# Patient Record
Sex: FEMALE | Race: WHITE | Employment: PART TIME | URBAN - METROPOLITAN AREA
[De-identification: names, ages, dates, MRNs, and addresses within clinical notes are randomized per-mention and may not be internally consistent; named-entity substitution may affect disease eponyms.]

---

## 2017-02-06 ENCOUNTER — LAB REQUISITION (OUTPATIENT)
Dept: LAB | Facility: HOSPITAL | Age: 57
End: 2017-02-06
Payer: COMMERCIAL

## 2017-02-06 DIAGNOSIS — N39.0 URINARY TRACT INFECTION: ICD-10-CM

## 2017-02-06 PROCEDURE — 87086 URINE CULTURE/COLONY COUNT: CPT | Performed by: INTERNAL MEDICINE

## 2017-02-06 PROCEDURE — 87186 SC STD MICRODIL/AGAR DIL: CPT | Performed by: INTERNAL MEDICINE

## 2017-02-06 PROCEDURE — 87077 CULTURE AEROBIC IDENTIFY: CPT | Performed by: INTERNAL MEDICINE

## 2017-03-16 ENCOUNTER — LAB ENCOUNTER (OUTPATIENT)
Dept: LAB | Facility: HOSPITAL | Age: 57
End: 2017-03-16
Attending: INTERNAL MEDICINE
Payer: COMMERCIAL

## 2017-03-16 DIAGNOSIS — Z00.00 ROUTINE GENERAL MEDICAL EXAMINATION AT A HEALTH CARE FACILITY: Primary | ICD-10-CM

## 2017-03-16 DIAGNOSIS — R73.01 IMPAIRED FASTING GLUCOSE: ICD-10-CM

## 2017-03-16 LAB
ALBUMIN SERPL BCP-MCNC: 3.8 G/DL (ref 3.5–4.8)
ALBUMIN/GLOB SERPL: 1.3 {RATIO} (ref 1–2)
ALP SERPL-CCNC: 55 U/L (ref 32–100)
ALT SERPL-CCNC: 31 U/L (ref 14–54)
ANION GAP SERPL CALC-SCNC: 10 MMOL/L (ref 0–18)
AST SERPL-CCNC: 23 U/L (ref 15–41)
BASOPHILS # BLD: 0 K/UL (ref 0–0.2)
BASOPHILS NFR BLD: 1 %
BILIRUB SERPL-MCNC: 0.6 MG/DL (ref 0.3–1.2)
BUN SERPL-MCNC: 11 MG/DL (ref 8–20)
BUN/CREAT SERPL: 15.7 (ref 10–20)
CALCIUM SERPL-MCNC: 9.4 MG/DL (ref 8.5–10.5)
CHLORIDE SERPL-SCNC: 103 MMOL/L (ref 95–110)
CO2 SERPL-SCNC: 27 MMOL/L (ref 22–32)
CREAT SERPL-MCNC: 0.7 MG/DL (ref 0.5–1.5)
EOSINOPHIL # BLD: 0.1 K/UL (ref 0–0.7)
EOSINOPHIL NFR BLD: 2 %
ERYTHROCYTE [DISTWIDTH] IN BLOOD BY AUTOMATED COUNT: 12.8 % (ref 11–15)
GLOBULIN PLAS-MCNC: 2.9 G/DL (ref 2.5–3.7)
GLUCOSE SERPL-MCNC: 118 MG/DL (ref 70–99)
HBA1C MFR BLD: 5.7 % (ref 4–6)
HCT VFR BLD AUTO: 38.6 % (ref 35–48)
HGB BLD-MCNC: 13.3 G/DL (ref 12–16)
LYMPHOCYTES # BLD: 1.7 K/UL (ref 1–4)
LYMPHOCYTES NFR BLD: 30 %
MCH RBC QN AUTO: 31.2 PG (ref 27–32)
MCHC RBC AUTO-ENTMCNC: 34.6 G/DL (ref 32–37)
MCV RBC AUTO: 90.4 FL (ref 80–100)
MONOCYTES # BLD: 0.5 K/UL (ref 0–1)
MONOCYTES NFR BLD: 8 %
NEUTROPHILS # BLD AUTO: 3.4 K/UL (ref 1.8–7.7)
NEUTROPHILS NFR BLD: 59 %
OSMOLALITY UR CALC.SUM OF ELEC: 290 MOSM/KG (ref 275–295)
PLATELET # BLD AUTO: 267 K/UL (ref 140–400)
PMV BLD AUTO: 8.2 FL (ref 7.4–10.3)
POTASSIUM SERPL-SCNC: 4.1 MMOL/L (ref 3.3–5.1)
PROT SERPL-MCNC: 6.7 G/DL (ref 5.9–8.4)
RBC # BLD AUTO: 4.27 M/UL (ref 3.7–5.4)
SODIUM SERPL-SCNC: 140 MMOL/L (ref 136–144)
TSH SERPL-ACNC: 2.15 UIU/ML (ref 0.34–5.6)
WBC # BLD AUTO: 5.7 K/UL (ref 4–11)

## 2017-03-16 PROCEDURE — 83036 HEMOGLOBIN GLYCOSYLATED A1C: CPT

## 2017-03-16 PROCEDURE — 80053 COMPREHEN METABOLIC PANEL: CPT

## 2017-03-16 PROCEDURE — 36415 COLL VENOUS BLD VENIPUNCTURE: CPT

## 2017-03-16 PROCEDURE — 84443 ASSAY THYROID STIM HORMONE: CPT

## 2017-03-16 PROCEDURE — 85025 COMPLETE CBC W/AUTO DIFF WBC: CPT

## 2018-01-19 ENCOUNTER — OFFICE VISIT (OUTPATIENT)
Dept: HEMATOLOGY/ONCOLOGY | Facility: HOSPITAL | Age: 58
End: 2018-01-19
Attending: INTERNAL MEDICINE
Payer: COMMERCIAL

## 2018-01-19 VITALS
WEIGHT: 196 LBS | TEMPERATURE: 98 F | BODY MASS INDEX: 33.46 KG/M2 | SYSTOLIC BLOOD PRESSURE: 172 MMHG | HEIGHT: 64 IN | HEART RATE: 81 BPM | RESPIRATION RATE: 16 BRPM | DIASTOLIC BLOOD PRESSURE: 82 MMHG

## 2018-01-19 DIAGNOSIS — D05.11 DUCTAL CARCINOMA IN SITU (DCIS) OF RIGHT BREAST: Primary | ICD-10-CM

## 2018-01-19 DIAGNOSIS — I10 ESSENTIAL HYPERTENSION: ICD-10-CM

## 2018-01-19 DIAGNOSIS — K21.00 GASTROESOPHAGEAL REFLUX DISEASE WITH ESOPHAGITIS: ICD-10-CM

## 2018-01-19 DIAGNOSIS — F32.A ANXIETY AND DEPRESSION: ICD-10-CM

## 2018-01-19 DIAGNOSIS — F41.9 ANXIETY AND DEPRESSION: ICD-10-CM

## 2018-01-19 PROCEDURE — 99214 OFFICE O/P EST MOD 30 MIN: CPT | Performed by: INTERNAL MEDICINE

## 2018-01-19 PROCEDURE — 99212 OFFICE O/P EST SF 10 MIN: CPT | Performed by: INTERNAL MEDICINE

## 2018-01-19 RX ORDER — ACETAMINOPHEN 160 MG
2000 TABLET,DISINTEGRATING ORAL DAILY
COMMUNITY

## 2018-01-21 PROBLEM — D05.11 DUCTAL CARCINOMA IN SITU (DCIS) OF RIGHT BREAST: Status: ACTIVE | Noted: 2018-01-21

## 2018-01-21 PROBLEM — I10 ESSENTIAL HYPERTENSION: Status: ACTIVE | Noted: 2018-01-21

## 2018-01-21 PROBLEM — K21.00 GASTROESOPHAGEAL REFLUX DISEASE WITH ESOPHAGITIS: Status: ACTIVE | Noted: 2018-01-21

## 2018-01-22 NOTE — PROGRESS NOTES
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 She has a strong family history of breast cancer with her maternal grandmother and mother. She denies known breast lesion, bone pain, or shortness of breath.     She Migraines previously diagnosed - CT brain negative -essentially resolved with [facility] and decrease stress   Hematological: Negative for adenopathy. Psychiatric/Behavioral: Positive for dysphoric mood. The patient is nervous/anxious.             Cu • Migraine, unspecified, without mention of intractable migraine without mention of status migrainosus 03/30/2012   • Mitral valve disorders(424.0) 03/30/2012   • N&V (nausea and vomiting) 2011    with temp   • Post-menopausal bleeding 2009   • Unspecified Abdominal: Soft. Bowel sounds are normal. She exhibits no distension. There is no tenderness. There is no guarding. Musculoskeletal: Normal range of motion. She exhibits no edema or tenderness. Lymphadenopathy:     She has no cervical adenopathy.    [facility], [facility] BOAZ MENDEZTL DIAG W CAD              SHEILA, 4/22/2015, 9:37.     INDICATIONS: Right breast cancer post lumpectomy and radiation therapy in               2004. 5 years of tamoxifen, until 2009.     BREAST COMPOS

## 2018-03-16 ENCOUNTER — HOSPITAL ENCOUNTER (OUTPATIENT)
Dept: MAMMOGRAPHY | Facility: HOSPITAL | Age: 58
Discharge: HOME | End: 2018-03-16
Attending: INTERNAL MEDICINE
Payer: COMMERCIAL

## 2018-03-16 DIAGNOSIS — D05.11 DUCTAL CARCINOMA IN SITU (DCIS) OF RIGHT BREAST: ICD-10-CM

## 2018-03-16 PROCEDURE — 77066 DX MAMMO INCL CAD BI: CPT | Performed by: INTERNAL MEDICINE

## 2018-03-30 ENCOUNTER — LAB ENCOUNTER (OUTPATIENT)
Dept: LAB | Facility: HOSPITAL | Age: 58
End: 2018-03-30
Attending: INTERNAL MEDICINE
Payer: COMMERCIAL

## 2018-03-30 DIAGNOSIS — D05.11 DUCTAL CARCINOMA IN SITU (DCIS) OF RIGHT BREAST: ICD-10-CM

## 2018-03-30 PROCEDURE — 85025 COMPLETE CBC W/AUTO DIFF WBC: CPT

## 2018-03-30 PROCEDURE — 36415 COLL VENOUS BLD VENIPUNCTURE: CPT

## 2018-03-30 PROCEDURE — 80061 LIPID PANEL: CPT

## 2018-03-30 PROCEDURE — 80050 GENERAL HEALTH PANEL: CPT

## 2018-03-30 PROCEDURE — 82306 VITAMIN D 25 HYDROXY: CPT

## 2018-03-30 PROCEDURE — 80053 COMPREHEN METABOLIC PANEL: CPT

## 2018-03-30 PROCEDURE — 83036 HEMOGLOBIN GLYCOSYLATED A1C: CPT

## 2018-07-02 NOTE — LETTER
WHERE IS YOUR PAIN NOW?  Debbie the areas on your body where you feel the described sensations.  Use the appropriate symbol.  Debbie the areas of radiation.  Include all affected areas.  Just to complete the picture, please draw in the face.     ACHE:  ^ ^ ^   NUMBNESS:  0000   PINS & NEEDLES:  = = = =                              ^ ^ ^                       0000              = = = =                                    ^ ^ ^                       0000            = = = =      BURNING:  XXXX   STABBING: ////                  XXXX                ////                         XXXX          ////     Please debbie the line below indicating your degree of pain right now  with 0 being no pain 10 being the worst pain possible.                                         0             1             2              3             4              5              6              7             8             9             10         Patient Signature:            
no fever

## 2019-02-11 ENCOUNTER — LAB ENCOUNTER (OUTPATIENT)
Dept: LAB | Facility: HOSPITAL | Age: 59
End: 2019-02-11
Attending: INTERNAL MEDICINE
Payer: COMMERCIAL

## 2019-02-11 DIAGNOSIS — R73.01 IMPAIRED FASTING GLUCOSE: ICD-10-CM

## 2019-02-11 DIAGNOSIS — E78.00 PURE HYPERCHOLESTEROLEMIA: Primary | ICD-10-CM

## 2019-02-11 DIAGNOSIS — Z00.00 ENCOUNTER FOR GENERAL ADULT MEDICAL EXAMINATION WITHOUT ABNORMAL FINDINGS: ICD-10-CM

## 2019-02-11 LAB
ALBUMIN SERPL BCP-MCNC: 3.9 G/DL (ref 3.5–4.8)
ALBUMIN/GLOB SERPL: 1.3 {RATIO} (ref 1–2)
ALP SERPL-CCNC: 59 U/L (ref 32–100)
ALT SERPL-CCNC: 55 U/L (ref 14–54)
ANION GAP SERPL CALC-SCNC: 11 MMOL/L (ref 0–18)
AST SERPL-CCNC: 31 U/L (ref 15–41)
BASOPHILS # BLD AUTO: 0.02 X10(3) UL (ref 0–0.2)
BASOPHILS NFR BLD AUTO: 0.4 %
BILIRUB SERPL-MCNC: 0.6 MG/DL (ref 0.3–1.2)
BILIRUB UR QL: NEGATIVE
BUN SERPL-MCNC: 9 MG/DL (ref 8–20)
BUN/CREAT SERPL: 11.8 (ref 10–20)
CALCIUM SERPL-MCNC: 9.8 MG/DL (ref 8.5–10.5)
CHLORIDE SERPL-SCNC: 104 MMOL/L (ref 95–110)
CHOLEST SERPL-MCNC: 280 MG/DL (ref 110–200)
CLARITY UR: CLEAR
CO2 SERPL-SCNC: 25 MMOL/L (ref 22–32)
COLOR UR: YELLOW
CREAT SERPL-MCNC: 0.76 MG/DL (ref 0.5–1.5)
CREAT UR-MCNC: 94 MG/DL
DEPRECATED RDW RBC AUTO: 41.6 FL (ref 35.1–46.3)
EOSINOPHIL # BLD AUTO: 0.08 X10(3) UL (ref 0–0.7)
EOSINOPHIL NFR BLD AUTO: 1.5 %
ERYTHROCYTE [DISTWIDTH] IN BLOOD BY AUTOMATED COUNT: 12.2 % (ref 11–15)
EST. AVERAGE GLUCOSE BLD GHB EST-MCNC: 128 MG/DL (ref 68–126)
GLOBULIN PLAS-MCNC: 2.9 G/DL (ref 2.5–3.7)
GLUCOSE SERPL-MCNC: 112 MG/DL (ref 70–99)
GLUCOSE UR-MCNC: NEGATIVE MG/DL
HBA1C MFR BLD HPLC: 6.1 % (ref ?–5.7)
HCT VFR BLD AUTO: 40.5 % (ref 35–48)
HDLC SERPL-MCNC: 43 MG/DL
HGB BLD-MCNC: 13.4 G/DL (ref 12–16)
HGB UR QL STRIP.AUTO: NEGATIVE
IMM GRANULOCYTES # BLD AUTO: 0.02 X10(3) UL (ref 0–1)
IMM GRANULOCYTES NFR BLD: 0.4 %
KETONES UR-MCNC: NEGATIVE MG/DL
LDLC SERPL CALC-MCNC: 192 MG/DL (ref 0–99)
LEUKOCYTE ESTERASE UR QL STRIP.AUTO: NEGATIVE
LYMPHOCYTES # BLD AUTO: 1.63 X10(3) UL (ref 1–4)
LYMPHOCYTES NFR BLD AUTO: 30.9 %
MCH RBC QN AUTO: 30.6 PG (ref 26–34)
MCHC RBC AUTO-ENTMCNC: 33.1 G/DL (ref 31–37)
MCV RBC AUTO: 92.5 FL (ref 80–100)
MICROALBUMIN UR-MCNC: 0.4 MG/DL (ref 0–1.8)
MICROALBUMIN/CREAT UR: 4.3 MG/G{CREAT} (ref 0–20)
MONOCYTES # BLD AUTO: 0.43 X10(3) UL (ref 0.1–1)
MONOCYTES NFR BLD AUTO: 8.2 %
NEUTROPHILS # BLD AUTO: 3.09 X10 (3) UL (ref 1.5–7.7)
NEUTROPHILS # BLD AUTO: 3.09 X10(3) UL (ref 1.5–7.7)
NEUTROPHILS NFR BLD AUTO: 58.6 %
NITRITE UR QL STRIP.AUTO: NEGATIVE
NONHDLC SERPL-MCNC: 237 MG/DL
OSMOLALITY UR CALC.SUM OF ELEC: 289 MOSM/KG (ref 275–295)
PATIENT FASTING: YES
PH UR: 5 [PH] (ref 5–8)
PLATELET # BLD AUTO: 316 10(3)UL (ref 150–450)
POTASSIUM SERPL-SCNC: 4.7 MMOL/L (ref 3.3–5.1)
PROT SERPL-MCNC: 6.8 G/DL (ref 5.9–8.4)
PROT UR-MCNC: NEGATIVE MG/DL
RBC # BLD AUTO: 4.38 X10(6)UL (ref 3.8–5.3)
SODIUM SERPL-SCNC: 140 MMOL/L (ref 136–144)
SP GR UR STRIP: 1.02 (ref 1–1.03)
TRIGL SERPL-MCNC: 223 MG/DL (ref 1–149)
TSH SERPL-ACNC: 1.58 UIU/ML (ref 0.45–5.33)
UROBILINOGEN UR STRIP-ACNC: <2
VIT C UR-MCNC: NEGATIVE MG/DL
WBC # BLD AUTO: 5.3 X10(3) UL (ref 4–11)

## 2019-02-11 PROCEDURE — 80061 LIPID PANEL: CPT

## 2019-02-11 PROCEDURE — 36415 COLL VENOUS BLD VENIPUNCTURE: CPT

## 2019-02-11 PROCEDURE — 85025 COMPLETE CBC W/AUTO DIFF WBC: CPT

## 2019-02-11 PROCEDURE — 80053 COMPREHEN METABOLIC PANEL: CPT

## 2019-02-11 PROCEDURE — 82570 ASSAY OF URINE CREATININE: CPT

## 2019-02-11 PROCEDURE — 82043 UR ALBUMIN QUANTITATIVE: CPT

## 2019-02-11 PROCEDURE — 83036 HEMOGLOBIN GLYCOSYLATED A1C: CPT

## 2019-02-11 PROCEDURE — 84443 ASSAY THYROID STIM HORMONE: CPT

## 2019-02-11 PROCEDURE — 81003 URINALYSIS AUTO W/O SCOPE: CPT

## 2019-08-15 ENCOUNTER — OFFICE VISIT (OUTPATIENT)
Dept: HEMATOLOGY/ONCOLOGY | Facility: HOSPITAL | Age: 59
End: 2019-08-15
Attending: INTERNAL MEDICINE
Payer: COMMERCIAL

## 2019-08-15 VITALS
SYSTOLIC BLOOD PRESSURE: 168 MMHG | WEIGHT: 197 LBS | OXYGEN SATURATION: 97 % | DIASTOLIC BLOOD PRESSURE: 75 MMHG | HEIGHT: 64 IN | BODY MASS INDEX: 33.63 KG/M2 | HEART RATE: 97 BPM | TEMPERATURE: 99 F | RESPIRATION RATE: 16 BRPM

## 2019-08-15 DIAGNOSIS — Z12.31 ENCOUNTER FOR SCREENING MAMMOGRAM FOR MALIGNANT NEOPLASM OF BREAST: Primary | ICD-10-CM

## 2019-08-15 DIAGNOSIS — I10 ESSENTIAL HYPERTENSION: ICD-10-CM

## 2019-08-15 DIAGNOSIS — F41.9 ANXIETY AND DEPRESSION: ICD-10-CM

## 2019-08-15 DIAGNOSIS — F32.A ANXIETY AND DEPRESSION: ICD-10-CM

## 2019-08-15 DIAGNOSIS — D05.11 DUCTAL CARCINOMA IN SITU OF RIGHT BREAST: ICD-10-CM

## 2019-08-15 DIAGNOSIS — R10.84 ABDOMINAL DISCOMFORT, GENERALIZED: ICD-10-CM

## 2019-08-15 PROCEDURE — 99214 OFFICE O/P EST MOD 30 MIN: CPT | Performed by: INTERNAL MEDICINE

## 2019-08-16 NOTE — PROGRESS NOTES
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 She has a strong family history of breast cancer with her maternal grandmother and mother. Unfortunately she has not had a repeat mammogram since 3/16/2018. Gastrointestinal: Negative for abdominal pain, constipation, nausea and vomiting. Feels better limiting wheat in her diet but has not been diagnosed with celiac disease   Endocrine: Negative for polydipsia, polyphagia and polyuria.    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 Neck supple. No thyromegaly present. Cardiovascular: Normal rate and regular rhythm. Murmur heard. I/VI REYNA   Pulmonary/Chest: Effort normal and breath sounds normal. She has no wheezes. She exhibits no tenderness. Patient's initial blood pressure was elevated today and remained elevated with a manual cuff to 156/76. The blood pressure was taken when the patient was upset with providing a history of her symptoms and with a discussion about her nephews.       Patient

## 2019-08-18 ENCOUNTER — HOSPITAL ENCOUNTER (EMERGENCY)
Facility: HOSPITAL | Age: 59
Discharge: HOME | End: 2019-08-18
Attending: EMERGENCY MEDICINE
Payer: COMMERCIAL

## 2019-08-18 ENCOUNTER — HOSPITAL ENCOUNTER (OUTPATIENT)
Age: 59
Discharge: HOME | End: 2019-08-18
Payer: COMMERCIAL

## 2019-08-18 ENCOUNTER — APPOINTMENT (OUTPATIENT)
Dept: CT IMAGING | Facility: HOSPITAL | Age: 59
End: 2019-08-18
Attending: EMERGENCY MEDICINE
Payer: COMMERCIAL

## 2019-08-18 VITALS
BODY MASS INDEX: 33.63 KG/M2 | DIASTOLIC BLOOD PRESSURE: 69 MMHG | TEMPERATURE: 100 F | HEART RATE: 92 BPM | SYSTOLIC BLOOD PRESSURE: 144 MMHG | OXYGEN SATURATION: 98 % | RESPIRATION RATE: 20 BRPM | WEIGHT: 197 LBS | HEIGHT: 64 IN

## 2019-08-18 VITALS
BODY MASS INDEX: 33.63 KG/M2 | RESPIRATION RATE: 20 BRPM | OXYGEN SATURATION: 99 % | SYSTOLIC BLOOD PRESSURE: 143 MMHG | HEART RATE: 96 BPM | WEIGHT: 197 LBS | TEMPERATURE: 99 F | HEIGHT: 64 IN | DIASTOLIC BLOOD PRESSURE: 70 MMHG

## 2019-08-18 DIAGNOSIS — K57.92 ACUTE DIVERTICULITIS: Primary | ICD-10-CM

## 2019-08-18 DIAGNOSIS — R11.2 NAUSEA VOMITING AND DIARRHEA: Primary | ICD-10-CM

## 2019-08-18 DIAGNOSIS — R19.7 NAUSEA VOMITING AND DIARRHEA: Primary | ICD-10-CM

## 2019-08-18 LAB
ALBUMIN SERPL-MCNC: 3.7 G/DL (ref 3.4–5)
ALBUMIN/GLOB SERPL: 1 {RATIO} (ref 1–2)
ALP LIVER SERPL-CCNC: 65 U/L (ref 46–118)
ALT SERPL-CCNC: 60 U/L (ref 13–56)
ANION GAP SERPL CALC-SCNC: 10 MMOL/L (ref 0–18)
AST SERPL-CCNC: 25 U/L (ref 15–37)
BASOPHILS # BLD AUTO: 0.02 X10(3) UL (ref 0–0.2)
BASOPHILS NFR BLD AUTO: 0.2 %
BILIRUB SERPL-MCNC: 0.4 MG/DL (ref 0.1–2)
BILIRUB UR QL: NEGATIVE
BUN BLD-MCNC: 13 MG/DL (ref 7–18)
BUN/CREAT SERPL: 16.3 (ref 10–20)
CALCIUM BLD-MCNC: 9 MG/DL (ref 8.5–10.1)
CHLORIDE SERPL-SCNC: 105 MMOL/L (ref 98–112)
CLARITY UR: CLEAR
CO2 SERPL-SCNC: 26 MMOL/L (ref 21–32)
COLOR UR: COLORLESS
CREAT BLD-MCNC: 0.8 MG/DL (ref 0.55–1.02)
DEPRECATED RDW RBC AUTO: 42.5 FL (ref 35.1–46.3)
EOSINOPHIL # BLD AUTO: 0.03 X10(3) UL (ref 0–0.7)
EOSINOPHIL NFR BLD AUTO: 0.2 %
ERYTHROCYTE [DISTWIDTH] IN BLOOD BY AUTOMATED COUNT: 12.6 % (ref 11–15)
GLOBULIN PLAS-MCNC: 3.7 G/DL (ref 2.8–4.4)
GLUCOSE BLD-MCNC: 109 MG/DL (ref 70–99)
GLUCOSE UR-MCNC: NEGATIVE MG/DL
HCT VFR BLD AUTO: 37.4 % (ref 35–48)
HGB BLD-MCNC: 12.6 G/DL (ref 12–16)
HGB UR QL STRIP.AUTO: NEGATIVE
IMM GRANULOCYTES # BLD AUTO: 0.04 X10(3) UL (ref 0–1)
IMM GRANULOCYTES NFR BLD: 0.3 %
KETONES UR-MCNC: NEGATIVE MG/DL
LEUKOCYTE ESTERASE UR QL STRIP.AUTO: NEGATIVE
LIPASE SERPL-CCNC: 117 U/L (ref 73–393)
LYMPHOCYTES # BLD AUTO: 1.62 X10(3) UL (ref 1–4)
LYMPHOCYTES NFR BLD AUTO: 12.5 %
M PROTEIN MFR SERPL ELPH: 7.4 G/DL (ref 6.4–8.2)
MCH RBC QN AUTO: 31.2 PG (ref 26–34)
MCHC RBC AUTO-ENTMCNC: 33.7 G/DL (ref 31–37)
MCV RBC AUTO: 92.6 FL (ref 80–100)
MONOCYTES # BLD AUTO: 0.99 X10(3) UL (ref 0.1–1)
MONOCYTES NFR BLD AUTO: 7.6 %
NEUTROPHILS # BLD AUTO: 10.25 X10 (3) UL (ref 1.5–7.7)
NEUTROPHILS # BLD AUTO: 10.25 X10(3) UL (ref 1.5–7.7)
NEUTROPHILS NFR BLD AUTO: 79.2 %
NITRITE UR QL STRIP.AUTO: NEGATIVE
OSMOLALITY SERPL CALC.SUM OF ELEC: 293 MOSM/KG (ref 275–295)
PH UR: 6 [PH] (ref 5–8)
PLATELET # BLD AUTO: 325 10(3)UL (ref 150–450)
POTASSIUM SERPL-SCNC: 3.3 MMOL/L (ref 3.5–5.1)
PROT UR-MCNC: NEGATIVE MG/DL
RBC # BLD AUTO: 4.04 X10(6)UL (ref 3.8–5.3)
SODIUM SERPL-SCNC: 141 MMOL/L (ref 136–145)
SP GR UR STRIP: 1 (ref 1–1.03)
UROBILINOGEN UR STRIP-ACNC: <2
VIT C UR-MCNC: NEGATIVE MG/DL
WBC # BLD AUTO: 13 X10(3) UL (ref 4–11)

## 2019-08-18 PROCEDURE — 99212 OFFICE O/P EST SF 10 MIN: CPT

## 2019-08-18 PROCEDURE — 99202 OFFICE O/P NEW SF 15 MIN: CPT

## 2019-08-18 PROCEDURE — 85025 COMPLETE CBC W/AUTO DIFF WBC: CPT | Performed by: EMERGENCY MEDICINE

## 2019-08-18 PROCEDURE — 83690 ASSAY OF LIPASE: CPT | Performed by: EMERGENCY MEDICINE

## 2019-08-18 PROCEDURE — 80053 COMPREHEN METABOLIC PANEL: CPT | Performed by: EMERGENCY MEDICINE

## 2019-08-18 PROCEDURE — 74176 CT ABD & PELVIS W/O CONTRAST: CPT | Performed by: EMERGENCY MEDICINE

## 2019-08-18 PROCEDURE — 36415 COLL VENOUS BLD VENIPUNCTURE: CPT

## 2019-08-18 PROCEDURE — 99284 EMERGENCY DEPT VISIT MOD MDM: CPT

## 2019-08-18 PROCEDURE — 81001 URINALYSIS AUTO W/SCOPE: CPT | Performed by: EMERGENCY MEDICINE

## 2019-08-18 RX ORDER — AMOXICILLIN AND CLAVULANATE POTASSIUM 875; 125 MG/1; MG/1
1 TABLET, FILM COATED ORAL 2 TIMES DAILY
Qty: 20 TABLET | Refills: 0 | Status: SHIPPED | OUTPATIENT
Start: 2019-08-18 | End: 2019-08-18

## 2019-08-18 RX ORDER — AMOXICILLIN AND CLAVULANATE POTASSIUM 875; 125 MG/1; MG/1
1 TABLET, FILM COATED ORAL 2 TIMES DAILY
Qty: 20 TABLET | Refills: 0 | Status: SHIPPED | OUTPATIENT
Start: 2019-08-18 | End: 2019-08-28

## 2019-08-18 NOTE — ED PROVIDER NOTES
Patient presents with:  Diarrhea      HPI:     Elly Segal is a 60year old female with a history of HTN, breast ca, GERD, anxiety, anemia presents with a chief complaint of diarrhea X2 weeks. Pt reports intermittent diarrhea over the last few weeks.  No nausea types were placed in this encounter. Labs performed this visit:  No results found for this or any previous visit (from the past 10 hour(s)).      Diagnosis:    ICD-10-CM    1. Nausea vomiting and diarrhea R11.2     R19.7        All results reviewed and

## 2019-08-18 NOTE — ED NOTES
Endy Ornelas presents with low bilateral abdominal pain since 8/1, body aches, diarrhea. Endy Ornelas states she thought symptoms would waldemar. Endy Ornelas states she continues to have abdominal cramping and diarrhea about 20 minutes after she eats. No vomiting.  No hx of

## 2019-08-18 NOTE — ED INITIAL ASSESSMENT (HPI)
REPORTS INTERMITTENT EPISODES OF DIARRHEA FOR THE PAST 2 WEEKS. T AMX 99.2 AT HOME. PATIENT REPORTS BOWEL MOVEMENTS AFTER EATING. PATIENT IS URINATING NORMALLY.

## 2019-08-18 NOTE — ED PROVIDER NOTES
Patient Seen in: [facility] AND [facility] Emergency Department    History   Patient presents with:  Nausea/Vomiting/Diarrhea (gastrointestinal)    Stated Complaint:     HPI    41-year-old female presenting for evaluation of diarrhea.   This has been intermittent 2011    with temp   • Post-menopausal bleeding 2009   • Unspecified adverse effect of unspecified drug, medicinal and biological substance 03/30/2012              Past Surgical History:   Procedure Laterality Date   • HYSTEROSCOPY,WITH SAMPLING  2009   • L Color Colorless (*)     All other components within normal limits   CBC W/ DIFFERENTIAL - Abnormal; Notable for the following components:    WBC 13.0 (*)     Neutrophil Absolute Prelim 10.25 (*)     Neutrophil Absolute 10.25 (*)     All other components wi pelvis. On reevaluation, patient resting comfortably. She has not been able to provide a liquid stool sample throughout the ED stay. CT concerning for mild sigmoid diverticulitis.   Patient is tolerating p.o. and pain is well controlled so we will

## 2019-08-19 NOTE — ED NOTES
Pt to CT at this time. Made aware of need for additional UA. Pt remains calm, cooperative, RR even and unlabored.

## 2019-09-18 ENCOUNTER — HOSPITAL ENCOUNTER (OUTPATIENT)
Dept: MAMMOGRAPHY | Facility: HOSPITAL | Age: 59
Discharge: HOME | End: 2019-09-18
Attending: INTERNAL MEDICINE
Payer: COMMERCIAL

## 2019-09-18 DIAGNOSIS — Z12.31 ENCOUNTER FOR SCREENING MAMMOGRAM FOR MALIGNANT NEOPLASM OF BREAST: ICD-10-CM

## 2019-09-18 PROCEDURE — 77063 BREAST TOMOSYNTHESIS BI: CPT | Performed by: INTERNAL MEDICINE

## 2019-09-18 PROCEDURE — 77067 SCR MAMMO BI INCL CAD: CPT | Performed by: INTERNAL MEDICINE

## 2020-11-20 DIAGNOSIS — Z20.822 SUSPECTED 2019 NOVEL CORONAVIRUS INFECTION: Primary | ICD-10-CM

## 2020-11-23 ENCOUNTER — APPOINTMENT (OUTPATIENT)
Dept: LAB | Facility: HOSPITAL | Age: 60
End: 2020-11-23
Attending: INTERNAL MEDICINE
Payer: COMMERCIAL

## 2020-11-23 DIAGNOSIS — Z20.822 SUSPECTED 2019 NOVEL CORONAVIRUS INFECTION: ICD-10-CM

## 2021-02-05 ENCOUNTER — OFFICE VISIT (OUTPATIENT)
Dept: HEMATOLOGY/ONCOLOGY | Facility: HOSPITAL | Age: 61
End: 2021-02-05
Attending: INTERNAL MEDICINE
Payer: COMMERCIAL

## 2021-02-05 VITALS
TEMPERATURE: 98 F | WEIGHT: 205 LBS | OXYGEN SATURATION: 98 % | SYSTOLIC BLOOD PRESSURE: 153 MMHG | DIASTOLIC BLOOD PRESSURE: 70 MMHG | HEIGHT: 64 IN | HEART RATE: 84 BPM | RESPIRATION RATE: 18 BRPM | BODY MASS INDEX: 35 KG/M2

## 2021-02-05 DIAGNOSIS — D05.11 DUCTAL CARCINOMA IN SITU OF RIGHT BREAST: Primary | ICD-10-CM

## 2021-02-05 DIAGNOSIS — F41.9 ANXIETY AND DEPRESSION: ICD-10-CM

## 2021-02-05 DIAGNOSIS — F32.A ANXIETY AND DEPRESSION: ICD-10-CM

## 2021-02-05 LAB
ALBUMIN SERPL-MCNC: 3.7 G/DL (ref 3.4–5)
ALBUMIN/GLOB SERPL: 1.1 {RATIO} (ref 1–2)
ALP LIVER SERPL-CCNC: 66 U/L
ALT SERPL-CCNC: 77 U/L
ANION GAP SERPL CALC-SCNC: 6 MMOL/L (ref 0–18)
AST SERPL-CCNC: 35 U/L (ref 15–37)
BASOPHILS # BLD AUTO: 0.02 X10(3) UL (ref 0–0.2)
BASOPHILS NFR BLD AUTO: 0.3 %
BILIRUB SERPL-MCNC: 0.2 MG/DL (ref 0.1–2)
BUN BLD-MCNC: 13 MG/DL (ref 7–18)
BUN/CREAT SERPL: 16.3 (ref 10–20)
CALCIUM BLD-MCNC: 9 MG/DL (ref 8.5–10.1)
CHLORIDE SERPL-SCNC: 108 MMOL/L (ref 98–112)
CO2 SERPL-SCNC: 27 MMOL/L (ref 21–32)
CREAT BLD-MCNC: 0.8 MG/DL
DEPRECATED RDW RBC AUTO: 41.1 FL (ref 35.1–46.3)
EOSINOPHIL # BLD AUTO: 0.11 X10(3) UL (ref 0–0.7)
EOSINOPHIL NFR BLD AUTO: 1.4 %
ERYTHROCYTE [DISTWIDTH] IN BLOOD BY AUTOMATED COUNT: 12 % (ref 11–15)
GLOBULIN PLAS-MCNC: 3.5 G/DL (ref 2.8–4.4)
GLUCOSE BLD-MCNC: 94 MG/DL (ref 70–99)
HCT VFR BLD AUTO: 38.8 %
HGB BLD-MCNC: 12.9 G/DL
IMM GRANULOCYTES # BLD AUTO: 0.02 X10(3) UL (ref 0–1)
IMM GRANULOCYTES NFR BLD: 0.3 %
LYMPHOCYTES # BLD AUTO: 2.03 X10(3) UL (ref 1–4)
LYMPHOCYTES NFR BLD AUTO: 25.6 %
M PROTEIN MFR SERPL ELPH: 7.2 G/DL (ref 6.4–8.2)
MCH RBC QN AUTO: 30.9 PG (ref 26–34)
MCHC RBC AUTO-ENTMCNC: 33.2 G/DL (ref 31–37)
MCV RBC AUTO: 93 FL
MONOCYTES # BLD AUTO: 0.69 X10(3) UL (ref 0.1–1)
MONOCYTES NFR BLD AUTO: 8.7 %
NEUTROPHILS # BLD AUTO: 5.05 X10 (3) UL (ref 1.5–7.7)
NEUTROPHILS # BLD AUTO: 5.05 X10(3) UL (ref 1.5–7.7)
NEUTROPHILS NFR BLD AUTO: 63.7 %
OSMOLALITY SERPL CALC.SUM OF ELEC: 292 MOSM/KG (ref 275–295)
PATIENT FASTING Y/N/NP: NO
PLATELET # BLD AUTO: 315 10(3)UL (ref 150–450)
POTASSIUM SERPL-SCNC: 4 MMOL/L (ref 3.5–5.1)
RBC # BLD AUTO: 4.17 X10(6)UL
SODIUM SERPL-SCNC: 141 MMOL/L (ref 136–145)
WBC # BLD AUTO: 7.9 X10(3) UL (ref 4–11)

## 2021-02-05 PROCEDURE — 99213 OFFICE O/P EST LOW 20 MIN: CPT | Performed by: INTERNAL MEDICINE

## 2021-02-05 NOTE — PROGRESS NOTES
HPI   2/5/2021  iMchaela Bell is a 63year old female who returns for follow-up of her DCIS. She has a strong family history of breast cancer with her maternal grandmother and mother. Unfortunately she has not had a repeat mammogram since 9/18/2019. congestion and hearing loss. Eyes: Negative for visual disturbance. Respiratory: Negative for cough and shortness of breath. Cardiovascular: Negative for chest pain and leg swelling.    Gastrointestinal: Negative for abdominal pain, constipation, na carcinoma in situ of right breast 7292    CLICK MAGNIFYING GLASS (ABOVE) OR F3 TO EXPAND 01/21/2004 MAMMOGRAM FIRST SCREENING  -  suspicious microcalcifications upper inner quadrant of her right breast 01/30/2004 MAMMOTOME BIOPSY STEROTACTIC  -  ductal car file        Minutes per session: Not on file      Stress: Not on file    Relationships      Social connections        Talks on phone: Not on file        Gets together: Not on file        Attends [employer] service: Not on file        Active member of club o (197 lb)  01/19/18 : 88.9 kg (196 lb)  03/04/16 : 85.7 kg (189 lb)     HENT:   Head: Normocephalic and atraumatic. Mouth/Throat: No oropharyngeal exudate. Eyes: Conjunctivae and EOM are normal. No scleral icterus. Neck: Normal range of motion.  Neck s and remained elevated with a manual cuff to 153/70. The blood pressure was taken when the patient was upset with providing a history of her symptoms and with a discussion about her nephews.       Patient is encouraged to see gastroenterology and to have a Neutrophils %      % 63.7 79.2   Lymphocytes %      % 25.6 12.5   Monocytes %      % 8.7 7.6   Eosinophils %      % 1.4 0.2   Basophils %      % 0.3 0.2   Immature Granulocyte %      % 0.3 0.3   Glucose      70 - 99 mg/dL 94 109 (H)   Sodium      136 - 1 IN 12 MONTHS.        Meds This Visit:  see list above    Imaging & Referrals:  Screening 2D 3D bilateral mammogram     2/10/2021 addendum REY T0M0 2D 3D screening bilateral  CONCLUSION:  There is no mammographic evidence of malignancy in either breast. As l

## 2021-02-10 ENCOUNTER — HOSPITAL ENCOUNTER (OUTPATIENT)
Dept: MAMMOGRAPHY | Facility: HOSPITAL | Age: 61
Discharge: HOME | End: 2021-02-10
Attending: INTERNAL MEDICINE
Payer: COMMERCIAL

## 2021-02-10 DIAGNOSIS — D05.11 DUCTAL CARCINOMA IN SITU OF RIGHT BREAST: ICD-10-CM

## 2021-02-10 PROCEDURE — 77067 SCR MAMMO BI INCL CAD: CPT | Performed by: INTERNAL MEDICINE

## 2021-02-10 PROCEDURE — 77063 BREAST TOMOSYNTHESIS BI: CPT | Performed by: INTERNAL MEDICINE

## 2021-04-28 ENCOUNTER — LAB ENCOUNTER (OUTPATIENT)
Dept: LAB | Facility: HOSPITAL | Age: 61
End: 2021-04-28
Attending: INTERNAL MEDICINE
Payer: COMMERCIAL

## 2021-04-28 DIAGNOSIS — Z00.00 WELL ADULT EXAM: Primary | ICD-10-CM

## 2021-04-28 DIAGNOSIS — R35.89 POLYURIA: ICD-10-CM

## 2021-04-28 PROCEDURE — 81001 URINALYSIS AUTO W/SCOPE: CPT

## 2022-02-11 ENCOUNTER — APPOINTMENT (OUTPATIENT)
Dept: HEMATOLOGY/ONCOLOGY | Facility: HOSPITAL | Age: 62
End: 2022-02-11
Attending: INTERNAL MEDICINE
Payer: COMMERCIAL

## 2022-03-10 ENCOUNTER — HOSPITAL ENCOUNTER (OUTPATIENT)
Age: 62
Discharge: HOME | End: 2022-03-10
Payer: COMMERCIAL

## 2022-03-10 VITALS
HEART RATE: 81 BPM | RESPIRATION RATE: 20 BRPM | TEMPERATURE: 98 F | OXYGEN SATURATION: 100 % | SYSTOLIC BLOOD PRESSURE: 169 MMHG | DIASTOLIC BLOOD PRESSURE: 72 MMHG

## 2022-03-10 DIAGNOSIS — R03.0 ELEVATED BLOOD PRESSURE READING: ICD-10-CM

## 2022-03-10 DIAGNOSIS — L03.114 CELLULITIS OF LEFT FOREARM: Primary | ICD-10-CM

## 2022-03-10 PROCEDURE — 99213 OFFICE O/P EST LOW 20 MIN: CPT

## 2022-03-10 RX ORDER — CEPHALEXIN 500 MG/1
500 CAPSULE ORAL 3 TIMES DAILY
Qty: 21 CAPSULE | Refills: 0 | Status: SHIPPED | OUTPATIENT
Start: 2022-03-10 | End: 2022-03-17

## 2022-03-23 ENCOUNTER — TELEPHONE (OUTPATIENT)
Dept: HEMATOLOGY/ONCOLOGY | Facility: HOSPITAL | Age: 62
End: 2022-03-23

## 2022-04-06 ENCOUNTER — TELEPHONE (OUTPATIENT)
Dept: HEMATOLOGY/ONCOLOGY | Facility: HOSPITAL | Age: 62
End: 2022-04-06

## 2022-04-06 NOTE — TELEPHONE ENCOUNTER
[facility] Breast pt of Dr. Mike Pitts, called 4/6/22

## 2022-04-28 ENCOUNTER — OFFICE VISIT (OUTPATIENT)
Dept: HEMATOLOGY/ONCOLOGY | Facility: HOSPITAL | Age: 62
End: 2022-04-28
Attending: INTERNAL MEDICINE
Payer: COMMERCIAL

## 2022-04-28 VITALS
BODY MASS INDEX: 34.83 KG/M2 | DIASTOLIC BLOOD PRESSURE: 75 MMHG | HEART RATE: 100 BPM | SYSTOLIC BLOOD PRESSURE: 156 MMHG | TEMPERATURE: 99 F | RESPIRATION RATE: 18 BRPM | OXYGEN SATURATION: 95 % | HEIGHT: 64 IN | WEIGHT: 204 LBS

## 2022-04-28 DIAGNOSIS — Z86.000 HISTORY OF DUCTAL CARCINOMA IN SITU (DCIS) OF BREAST: Primary | ICD-10-CM

## 2022-04-28 PROCEDURE — 99213 OFFICE O/P EST LOW 20 MIN: CPT | Performed by: INTERNAL MEDICINE

## 2022-05-04 ENCOUNTER — HOSPITAL ENCOUNTER (OUTPATIENT)
Dept: MAMMOGRAPHY | Facility: HOSPITAL | Age: 62
Discharge: HOME | End: 2022-05-04
Attending: INTERNAL MEDICINE
Payer: COMMERCIAL

## 2022-05-04 DIAGNOSIS — Z12.31 ENCOUNTER FOR SCREENING MAMMOGRAM FOR MALIGNANT NEOPLASM OF BREAST: ICD-10-CM

## 2022-05-04 PROCEDURE — 77067 SCR MAMMO BI INCL CAD: CPT | Performed by: INTERNAL MEDICINE

## 2022-05-04 PROCEDURE — 77063 BREAST TOMOSYNTHESIS BI: CPT | Performed by: INTERNAL MEDICINE

## 2022-05-12 ENCOUNTER — HOSPITAL ENCOUNTER (OUTPATIENT)
Dept: CV DIAGNOSTICS | Facility: HOSPITAL | Age: 62
Discharge: HOME | End: 2022-05-12
Attending: INTERNAL MEDICINE
Payer: COMMERCIAL

## 2022-05-12 DIAGNOSIS — R00.2 PALPITATIONS: ICD-10-CM

## 2022-05-12 PROCEDURE — 93306 TTE W/DOPPLER COMPLETE: CPT | Performed by: INTERNAL MEDICINE

## 2022-05-18 ENCOUNTER — HOSPITAL ENCOUNTER (OUTPATIENT)
Dept: MAMMOGRAPHY | Facility: HOSPITAL | Age: 62
Discharge: HOME | End: 2022-05-18
Attending: INTERNAL MEDICINE
Payer: COMMERCIAL

## 2022-05-18 DIAGNOSIS — R92.1 BREAST CALCIFICATIONS ON MAMMOGRAM: Primary | ICD-10-CM

## 2022-05-18 DIAGNOSIS — R92.8 ABNORMALITY OF LEFT BREAST ON SCREENING MAMMOGRAM: ICD-10-CM

## 2022-05-18 DIAGNOSIS — Z86.000 HISTORY OF DUCTAL CARCINOMA IN SITU (DCIS) OF BREAST: ICD-10-CM

## 2022-05-18 PROCEDURE — 77061 BREAST TOMOSYNTHESIS UNI: CPT | Performed by: INTERNAL MEDICINE

## 2022-05-18 PROCEDURE — 77065 DX MAMMO INCL CAD UNI: CPT | Performed by: INTERNAL MEDICINE

## 2022-06-01 ENCOUNTER — HOSPITAL ENCOUNTER (OUTPATIENT)
Dept: MAMMOGRAPHY | Facility: HOSPITAL | Age: 62
Discharge: HOME | End: 2022-06-01
Attending: INTERNAL MEDICINE
Payer: COMMERCIAL

## 2022-06-01 DIAGNOSIS — R92.1 BREAST CALCIFICATIONS ON MAMMOGRAM: ICD-10-CM

## 2022-06-01 DIAGNOSIS — Z86.000 HISTORY OF DUCTAL CARCINOMA IN SITU (DCIS) OF BREAST: ICD-10-CM

## 2022-06-01 PROCEDURE — 19082 BX BREAST ADD LESION STRTCTC: CPT | Performed by: INTERNAL MEDICINE

## 2022-06-01 PROCEDURE — 88360 TUMOR IMMUNOHISTOCHEM/MANUAL: CPT | Performed by: INTERNAL MEDICINE

## 2022-06-01 PROCEDURE — 19081 BX BREAST 1ST LESION STRTCTC: CPT | Performed by: INTERNAL MEDICINE

## 2022-06-01 PROCEDURE — 88305 TISSUE EXAM BY PATHOLOGIST: CPT | Performed by: INTERNAL MEDICINE

## 2022-06-01 PROCEDURE — 77065 DX MAMMO INCL CAD UNI: CPT | Performed by: INTERNAL MEDICINE

## 2022-06-01 PROCEDURE — 19499 UNLISTED PROCEDURE BREAST: CPT | Performed by: INTERNAL MEDICINE

## 2022-06-01 NOTE — PROCEDURES
[facility]  Procedure Note    Pradeep Sheldon Patient Status:  Outpatient    1960 MRN [id number]   Location [address] Attending Jasmyne Polanco MD   Hosp Day # 0 PCP Vale Russell MD     Procedure: Left breast stereotactic/aretha guided biopsy 2 sites    Pre-Procedure Diagnosis:    1. Left breast calcifications  2. Left breast asymmetry    Post-Procedure Diagnosis:   1. Left breast calcifications  2. Left breast asymmetry      Anesthesia:  Local    Findings:    1. Left breast calcifications  2.  Left breast asymmetry      Specimens: multiple cores at each site    Blood Loss:  minimal    Tourniquet Time: none  Complications:  None  Drains:  none      Sonia Bailey MD  2022

## 2022-06-02 ENCOUNTER — TELEPHONE (OUTPATIENT)
Dept: HEMATOLOGY/ONCOLOGY | Facility: HOSPITAL | Age: 62
End: 2022-06-02

## 2022-06-02 NOTE — TELEPHONE ENCOUNTER
I returned the patient's call as she is noted DCIS identified from her left breast biopsy. I reviewed with Shelia Fields that we can discuss her plan of care at her follow-up appointment. Patient mentions based on her prior history with this diagnosis in the opposite breast, she is comfortable with waiting for the next available appointment which will likely be at some point early next week. Patient is interested in undergoing a bilateral mastectomy with Dr. César Camacho. Patient mentions that she no longer wants to have regular mammography imaging following this diagnosis. She feels better and more comfortable with the diagnosis following this brief telephone encounter. She thanked me for the call and information. Our scheduling team will get her set up for a follow up with me. Efren Lake MD  [facility]  [address], [facility]

## 2022-06-02 NOTE — TELEPHONE ENCOUNTER
Patient calling and saw on MyChart the results of her Biopsy. Knows it is malignant, And would like to speak with Dr Yvonne Schirmer as soon as possible.

## 2022-06-03 ENCOUNTER — TELEPHONE (OUTPATIENT)
Dept: HEMATOLOGY/ONCOLOGY | Facility: HOSPITAL | Age: 62
End: 2022-06-03

## 2022-06-03 NOTE — TELEPHONE ENCOUNTER
Called patient and scheduled a follow up with Dr. Jose Malcolm for Monday 6/6 at 1:00pm- patient in agreement with date and time of appointment

## 2022-06-03 NOTE — TELEPHONE ENCOUNTER
Patient is s/p left breast stereotactic biopsy, performed 6/1/22 at [facility] AND [facility].  Patient has discussed malignant results with Dr. Jose Malcolm and has been referred to Dr. Dayna Gonsalez for further management. Phoned patient and introduced myself as Breast RN Navigator. Shared with patient my role to provide support and education, assist with care coordination, as well as connect her to supportive resources as she may need them. Patient shares her distant history of DCIS to her right breast treated with lumpectomy, radiation and endocrine therapy. Patient would like to meet with Dr. Dayna Gonsalez to discuss bilateral mastectomies. Patient shares given her history the anxiety associated with breast imaging has motivated her to this decision. Discussed with patient her thoughts regarding breast reconstruction, and she shares this is not something she wishes to pursue. Patient has had genetic testing in the past.  This was done in 2009 and was BRCA only. Discussed the now available wider panels and patient is interested in having this information. Scheduled patient for appointments on Friday 6/10/22 with Maria Isabel Freeman at 11am, and Dr. Dayna Gonsalez at noon. Patient has scheduled office follow up with Dr. Jose Malcolm for Monday 6/6/22. Provided patient with my contact information and have encouraged her to call with any questions or concerns.

## 2022-06-06 ENCOUNTER — OFFICE VISIT (OUTPATIENT)
Dept: HEMATOLOGY/ONCOLOGY | Facility: HOSPITAL | Age: 62
End: 2022-06-06
Attending: INTERNAL MEDICINE
Payer: COMMERCIAL

## 2022-06-06 VITALS
DIASTOLIC BLOOD PRESSURE: 66 MMHG | WEIGHT: 204 LBS | BODY MASS INDEX: 34.83 KG/M2 | TEMPERATURE: 99 F | HEART RATE: 86 BPM | OXYGEN SATURATION: 97 % | SYSTOLIC BLOOD PRESSURE: 138 MMHG | HEIGHT: 64 IN | RESPIRATION RATE: 18 BRPM

## 2022-06-06 DIAGNOSIS — D05.12 DUCTAL CARCINOMA IN SITU (DCIS) OF LEFT BREAST: Primary | ICD-10-CM

## 2022-06-06 DIAGNOSIS — Z86.000 HISTORY OF DUCTAL CARCINOMA IN SITU (DCIS) OF BREAST: ICD-10-CM

## 2022-06-06 PROCEDURE — 99211 OFF/OP EST MAY X REQ PHY/QHP: CPT

## 2022-06-10 ENCOUNTER — OFFICE VISIT (OUTPATIENT)
Dept: SURGERY | Facility: CLINIC | Age: 62
End: 2022-06-10
Payer: COMMERCIAL

## 2022-06-10 ENCOUNTER — GENETICS ENCOUNTER (OUTPATIENT)
Dept: GENETICS | Facility: HOSPITAL | Age: 62
End: 2022-06-10
Attending: GENETIC COUNSELOR, MS
Payer: COMMERCIAL

## 2022-06-10 ENCOUNTER — NURSE ONLY (OUTPATIENT)
Dept: HEMATOLOGY/ONCOLOGY | Facility: HOSPITAL | Age: 62
End: 2022-06-10
Attending: INTERNAL MEDICINE
Payer: COMMERCIAL

## 2022-06-10 ENCOUNTER — LAB ENCOUNTER (OUTPATIENT)
Dept: LAB | Facility: HOSPITAL | Age: 62
End: 2022-06-10
Payer: COMMERCIAL

## 2022-06-10 VITALS
SYSTOLIC BLOOD PRESSURE: 145 MMHG | RESPIRATION RATE: 16 BRPM | WEIGHT: 203 LBS | DIASTOLIC BLOOD PRESSURE: 80 MMHG | OXYGEN SATURATION: 97 % | TEMPERATURE: 98 F | HEART RATE: 73 BPM | HEIGHT: 64 IN | BODY MASS INDEX: 34.66 KG/M2

## 2022-06-10 DIAGNOSIS — Z85.3 PERSONAL HISTORY OF MALIGNANT NEOPLASM OF BREAST: ICD-10-CM

## 2022-06-10 DIAGNOSIS — D05.12 DUCTAL CARCINOMA IN SITU (DCIS) OF LEFT BREAST: Primary | ICD-10-CM

## 2022-06-10 DIAGNOSIS — Z01.818 PRE-OP EXAM: ICD-10-CM

## 2022-06-10 DIAGNOSIS — Z80.3 FAMILY HISTORY OF MALIGNANT NEOPLASM OF BREAST: ICD-10-CM

## 2022-06-10 LAB
ANION GAP SERPL CALC-SCNC: 6 MMOL/L (ref 0–18)
BUN BLD-MCNC: 17 MG/DL (ref 7–18)
BUN/CREAT SERPL: 21 (ref 10–20)
CALCIUM BLD-MCNC: 10 MG/DL (ref 8.5–10.1)
CHLORIDE SERPL-SCNC: 106 MMOL/L (ref 98–112)
CO2 SERPL-SCNC: 28 MMOL/L (ref 21–32)
CREAT BLD-MCNC: 0.81 MG/DL
FASTING STATUS PATIENT QL REPORTED: YES
GLUCOSE BLD-MCNC: 96 MG/DL (ref 70–99)
OSMOLALITY SERPL CALC.SUM OF ELEC: 291 MOSM/KG (ref 275–295)
POTASSIUM SERPL-SCNC: 4.4 MMOL/L (ref 3.5–5.1)
SODIUM SERPL-SCNC: 140 MMOL/L (ref 136–145)

## 2022-06-10 PROCEDURE — 36415 COLL VENOUS BLD VENIPUNCTURE: CPT

## 2022-06-10 PROCEDURE — 96040 HC GENETIC COUNSELING EA 30 MIN: CPT | Performed by: GENETIC COUNSELOR, MS

## 2022-06-10 PROCEDURE — 80048 BASIC METABOLIC PNL TOTAL CA: CPT

## 2022-06-10 NOTE — PATIENT INSTRUCTIONS
Dr. Hermelinda Parsons  Tel: [phone]  Fax: [phone]  [address] [place], [place] [address]  [phone]     Surgery/Procedure: Bilateral simple mastectomies, left lymphoscintigraphy, left sentinel lymph node biopsy, possible left axillary lymph node dissection. Anesthesia:   Gen + request pre op pec block from anesthesia  Surgery Length:   3 hours CPT:  35703,07188,37146   Wire LOC:   No   Nuc Med:   Yes Indy Seed:  No       Dx & ICD-10: Ductal carcinoma in situ (DCIS) of left breast (J63.04)   Radiology Instructions: N/a   _______________________________________________________________________________    1. Someone must accompany you the day of the procedure to drive you home safely, because of anesthesia. 2. You must remove any kind of makeup, nail polish, lotions, powders, creams or deodorant. 3. [place] ONLY: Pre-admission will give instruct you on when to take Gatorade and Tylenol/acetaminophen prior to your surgery, purchase 2 - 12oz bottles of regular Gatorade (NOT RED/SUGAR FREE). Otherwise, you may not eat or drink anything else after 11PM the night before surgery. 4. [place] ONLY: You may not eat or drink anything after midnight the day of your surgery. 5. Wear comfortable clothing that can be easily removed. 6. If you wear dentures, contacts lenses, or any prosthesis, you will be asked to remove them. 7. Do not drink alcohol or smoke 24 hours prior to your procedure. 8. Bring a picture ID and your insurance card. 9. You will be contacted by the hospital for Pre-Admission Covid-19 testing (regardless of vaccination status) to be scheduled as an appointment prior to surgery. They will call closer to the surgery date to set this up, because the earliest this can be done is 72 hours prior to surgery. 10.  The Pre-Admission Testing Department will call the day before to confirm your procedure, give you the time you need to arrive by and directions on where to go. They begin making calls after 2pm, if you are not contacted by 4pm, please call the surgeon's office listed above. 11. Do not take any blood thinners at least one week prior to the procedure/surgery. This includes aspirin, baby aspirin, Ibuprofen products, herbal supplements, diet medications, vitamin E, fish oil and green tea supplements. Please check other supplements for these ingredients. *TYLENOL or acetaminophen is acceptable*  12. If you take Coumadin, Plavix, Xarelto, or Eliquis, please contact your prescribing physician for special instructions on how long to hold. If you take insulin contact your primary care physician for special instructions. 15. Our surgery scheduler, Surjit Barclay, will be contacting you to discuss surgery dates. If you have any questions related to scheduling your surgery, please reach out to her at [phone].  _____________________________________________________________________  PRE-OPERATIVE TESTING IF INDICATED BELOW  PLEASE COMPLETE ASAP (AT LEAST 7-10 DAYS PRIOR TO SURGERY)  [] CBC [x] BMP [] CMP [] EKG    [] PT, PTT, INR [] Cardiac Clearance  [] H&P Medical Clearance [] Chest X-ray     Please call Central Scheduling to schedule an appointment for pre-operative labs/tests @ [phone]    Does the patient have a pacemaker or ICD?      [] Yes   [x] No

## 2022-06-17 ENCOUNTER — OFFICE VISIT (OUTPATIENT)
Dept: PHYSICAL THERAPY | Facility: HOSPITAL | Age: 62
End: 2022-06-17
Attending: SURGERY
Payer: COMMERCIAL

## 2022-06-17 NOTE — PATIENT INSTRUCTIONS
ARM EXERCISES AFTER BREAST SURGERY:    When you begin the exercise program, you will find that you may tire easily and there will be some discomfort as you attempt to perform the movements. However, you should continue to perform them to the point of slight discomfort, pulling, and stretching, but not to the point of pain. It may take several weeks before you are able to complete some of the exercises. Work at your own pace, your progress and amount of movement may be gradual. It may be helpful to take your pain medication an hour before starting. Persistence is the key to regaining complete range of motion. Do the exercises slowly and begin to add holds at the end range of each exercise. This helps stretch and strengthen the muscles. If you are having difficulty performing the exercises and feel you are not making progress, tell your surgeon. Some individuals need the assistance of a physical therapist to regain complete range of motion. After breast surgery, it is not uncommon for some to favor the use of their non-surgical arm as they resume their daily activities. Weakness in the surgical arm, which most experience to some degree, will cause this to happen. However, it is helpful if you remember that normal use of the surgical arm will gradually increase strength and range of motion. Assessing your range of motion:   If you could perform the following prior to surgery, are you able now to:   - Brush and comb your hair?  - Pull a shirt or sweater over your head? - Close a back-fastening bra?  - Completely zip up a dress that has a long back zipper? - Wash the upper part of your back in the shoulder blade area? - Reach over your head into a cabinet to remove an object? - Make a double bed? If you cannot perform your full range of motion approximately 8 weeks after your surgery, ask your surgeon if a referral to Physical Therapy is appropriate.  It is important to your recovery to regain your range of motion early in your recovery. Immediately after surgery:  - Take it easy, but do not avoid movement of your arm  - You can initiate gentle movements but do not lift anything heavy  - If you have drains, do not lift arm higher than shoulder height  - Playing cards or board games is a great way to initiate gentle movement of your arm  - If you have pain or difficulty moving your arm, Pendulum Shoulder Circles can be started immediately. These exercises will help decrease some of your pain and will initiate movement. One week after surgery:  - Begin using your arm for more activities, but still do not lift anything heavy  - If you still have a drain, ask your surgeon if it is OK to lift your arm higher than shoulder height. - If your arm is still painful and/or weak, keep doing the Pendulum Shoulder Circles but also progress towards doing the Wand Exercises  - Progress towards the AROM exercises (Active Range of Motion). - Keep progressing how high you are able to lift your arm  Four weeks after surgery:   - Keep progressing how high you are able to lift your arm  - You are now able to begin light lifting. Begin slowly, nothing more than a gallon of milk  Eight weeks after surgery:  - If you cannot perform your full range of motion approximately 8 weeks after your surgery, ask your surgeon if a referral to Physical Therapy is appropriate. It is important to your recovery to regain your range of motion early in your recovery. - Continue to progress using your arm as you did prior to surgery  - Slowly begin lifting more and resuming your normal activities (housework, exercising, and work)              Reference:  Ivon Shaw Breast Cancer Treatment Handbook: The Comprehensive Patient Navigation Guide. 8th Edition. [employer] 2014.  ISBN-13:736-8-06186-09-5  HEP2go for illustrations

## 2022-06-21 ENCOUNTER — TELEPHONE (OUTPATIENT)
Dept: GENETICS | Facility: HOSPITAL | Age: 62
End: 2022-06-21

## 2022-06-21 NOTE — TELEPHONE ENCOUNTER
Shared NEGATIVE genetic testing results with Tanvir Gunterfransisco over phone. She opted for 47 gene panel through [facility] [facility] ([employer] Common Hereditary Cancers Panel) and all results were negative. Released these results to her through lab's portal and will mail her a copy as well. She was reassured to hear this. All her questions were answered to the best of my ability and she was appreciative of the call.

## 2022-06-22 DIAGNOSIS — D05.12 DUCTAL CARCINOMA IN SITU (DCIS) OF LEFT BREAST: Primary | ICD-10-CM

## 2022-06-23 ENCOUNTER — OFFICE VISIT (OUTPATIENT)
Dept: HEMATOLOGY/ONCOLOGY | Facility: HOSPITAL | Age: 62
End: 2022-06-23
Attending: INTERNAL MEDICINE
Payer: COMMERCIAL

## 2022-06-23 DIAGNOSIS — D05.12 DUCTAL CARCINOMA IN SITU (DCIS) OF LEFT BREAST: Primary | ICD-10-CM

## 2022-06-23 PROCEDURE — 99211 OFF/OP EST MAY X REQ PHY/QHP: CPT

## 2022-06-23 RX ORDER — LIDOCAINE AND PRILOCAINE 25; 25 MG/G; MG/G
CREAM TOPICAL ONCE
Status: CANCELLED | OUTPATIENT
Start: 2022-06-23 | End: 2022-06-23

## 2022-06-23 RX ORDER — DIAZEPAM 5 MG/1
5 TABLET ORAL AS NEEDED
Status: CANCELLED | OUTPATIENT
Start: 2022-06-23

## 2022-06-24 ENCOUNTER — TELEPHONE (OUTPATIENT)
Dept: GENERAL RADIOLOGY | Facility: HOSPITAL | Age: 62
End: 2022-06-24

## 2022-06-24 NOTE — TELEPHONE ENCOUNTER
0920: Spoke with Johnathan Diaz at this time. Discussed sentinel lymph node mapping procedure to be done in the   nuclear medicine department prior to surgery on Tuesday, June 28. Procedure explained and questions answered. Ms. Natacha Becerril verbalized understanding and gratitude for the call.

## 2022-06-24 NOTE — PROGRESS NOTES
Patient presents to the [facility] for preoperative education. She is accompanied by a close friend for support. Patient is scheduled on 6/28/22 for bilateral simple mastectomies, left lymphoscintigraphy, left sentinel lymph node biopsy, possible left axillary lymph node dissection with Dr. Kati Bunch. Reviewed with patient lymphoscintigraphy, why this is performed and what to expect. Patient aware she will receive a call from Radiology RN at THE [facility] to reinforce and address any questions. Patient is aware she will have KENDRICK drains placed during her surgery. Shared with patient she may have 2-4 drains. Educated as to expected observations of KENDRICK drain output. This included color and volume of output. Educated as to when is appropriate to notify MD office. KENDRICK drain care to be performed BID, and prn dependant on drain output. KENDRICK drain care demonstrated to patient and her Care Partner. This included hand hygiene, stripping of tubing, emptying of drain, as well as measurement and documentation of output. Patient and Care Partner able to successfully demonstrate drain care to this writer. Trouble shooting of drain discussed as well. Provided patient with measurement cups, alcohol swabs, drain record and written drain instructions. Instructed patient she may shower the day after surgery. Patient is aware she will have a surgical dressing placed during surgery. This may consist of an elastic bandage and/or surgical bra. If elastic bandage was placed this may be removed on the 1st or 2nd day after surgery. Surgical dressing may be removed and discarded prior to showering. Shared with patient she will have steri strip dressing applied to her incision which will remain in place. Demonstrated to patient KENDRICK drain dressing of biopatch and tegaderm. Instructed patient should moisture be observed under tegaderm following showering, dressing must be changed as this is an increased risk for infection. Demonstrated dressing change to patient and Care Partner. Dressing supplies provided. Instructed patient to assess KENDRICK drain dressing prior to showering and reinforce if needed. Provided patient with lanyards for drain management while showering. Provided patient with dressing supplies to include kerlix gauze dressing as well as ABD pads. Educated patient to expect drainage along steri strip dressing for 1-3 days post operatively. Instructed patient to place guaze dressing on surgical incision during this time frame and then for comfort following this time frame. Patient is aware to continue to wear post operative bra, if placed during surgery. Also provided patient with breast camsiole for post operative management. Patient has been seen by Physical Therapy for pre operative assessment and education. Reinforced to patient activity restrictions. Patient is aware she will be discharged with narcotic pain medication. Reinforced to patient pain scale and when narcotic would be appropriate. Reinforced to patient side effects of narcotic medication and management of constipation. Also discussed management of pain with Tylenol or Ibuprofen. Cold application can also be performed for pain management. Instructed patient to limit cold application to 10 minutes and to not apply directly to her skin. Patient aware she may not drive until off narcotic for 24 hours and able to react in an emergency. Patient has scheduled follow up with Per HUERTA on 7/11/22, and Dr. Dino Zhang on 7/12/22. Emotional support provided to patient. Encouraged her to call with any questions, concerns, or needs.

## 2022-06-25 ENCOUNTER — LAB ENCOUNTER (OUTPATIENT)
Dept: LAB | Facility: HOSPITAL | Age: 62
End: 2022-06-25
Attending: SURGERY
Payer: COMMERCIAL

## 2022-06-25 ENCOUNTER — NURSE ONLY (OUTPATIENT)
Dept: LAB | Facility: HOSPITAL | Age: 62
End: 2022-06-25
Attending: SURGERY
Payer: COMMERCIAL

## 2022-06-25 DIAGNOSIS — D05.12 DUCTAL CARCINOMA IN SITU (DCIS) OF LEFT BREAST: ICD-10-CM

## 2022-06-25 DIAGNOSIS — Z20.822 ENCOUNTER FOR PREOPERATIVE SCREENING LABORATORY TESTING FOR COVID-19 VIRUS: ICD-10-CM

## 2022-06-25 DIAGNOSIS — Z01.812 ENCOUNTER FOR PREOPERATIVE SCREENING LABORATORY TESTING FOR COVID-19 VIRUS: ICD-10-CM

## 2022-06-25 LAB — SARS-COV-2 RNA RESP QL NAA+PROBE: NOT DETECTED

## 2022-06-25 PROCEDURE — 93010 ELECTROCARDIOGRAM REPORT: CPT | Performed by: SURGERY

## 2022-06-25 PROCEDURE — 93005 ELECTROCARDIOGRAM TRACING: CPT

## 2022-06-28 ENCOUNTER — HOSPITAL ENCOUNTER (OUTPATIENT)
Facility: HOSPITAL | Age: 62
Setting detail: HOSPITAL OUTPATIENT SURGERY
Discharge: HOME | End: 2022-06-28
Attending: SURGERY | Admitting: SURGERY
Payer: COMMERCIAL

## 2022-06-28 ENCOUNTER — ANESTHESIA EVENT (OUTPATIENT)
Dept: SURGERY | Facility: HOSPITAL | Age: 62
End: 2022-06-28
Payer: COMMERCIAL

## 2022-06-28 ENCOUNTER — HOSPITAL ENCOUNTER (OUTPATIENT)
Dept: NUCLEAR MEDICINE | Facility: HOSPITAL | Age: 62
Setting detail: HOSPITAL OUTPATIENT SURGERY
Discharge: HOME | End: 2022-06-28
Attending: SURGERY | Admitting: SURGERY
Payer: COMMERCIAL

## 2022-06-28 ENCOUNTER — ANESTHESIA (OUTPATIENT)
Dept: SURGERY | Facility: HOSPITAL | Age: 62
End: 2022-06-28
Payer: COMMERCIAL

## 2022-06-28 VITALS
BODY MASS INDEX: 34.83 KG/M2 | HEART RATE: 74 BPM | OXYGEN SATURATION: 96 % | TEMPERATURE: 98 F | HEIGHT: 64 IN | DIASTOLIC BLOOD PRESSURE: 60 MMHG | WEIGHT: 204 LBS | SYSTOLIC BLOOD PRESSURE: 136 MMHG | RESPIRATION RATE: 16 BRPM

## 2022-06-28 DIAGNOSIS — Z01.812 ENCOUNTER FOR PREOPERATIVE SCREENING LABORATORY TESTING FOR COVID-19 VIRUS: Primary | ICD-10-CM

## 2022-06-28 DIAGNOSIS — D05.12 DUCTAL CARCINOMA IN SITU (DCIS) OF LEFT BREAST: ICD-10-CM

## 2022-06-28 DIAGNOSIS — Z20.822 ENCOUNTER FOR PREOPERATIVE SCREENING LABORATORY TESTING FOR COVID-19 VIRUS: Primary | ICD-10-CM

## 2022-06-28 PROCEDURE — 88331 PATH CONSLTJ SURG 1 BLK 1SPC: CPT | Performed by: SURGERY

## 2022-06-28 PROCEDURE — 78195 LYMPH SYSTEM IMAGING: CPT | Performed by: SURGERY

## 2022-06-28 PROCEDURE — 88332 PATH CONSLTJ SURG EA ADD BLK: CPT | Performed by: SURGERY

## 2022-06-28 PROCEDURE — 88342 IMHCHEM/IMCYTCHM 1ST ANTB: CPT | Performed by: SURGERY

## 2022-06-28 PROCEDURE — 0HBV0ZZ EXCISION OF BILATERAL BREAST, OPEN APPROACH: ICD-10-PCS | Performed by: SURGERY

## 2022-06-28 PROCEDURE — 07B60ZX EXCISION OF LEFT AXILLARY LYMPHATIC, OPEN APPROACH, DIAGNOSTIC: ICD-10-PCS | Performed by: SURGERY

## 2022-06-28 PROCEDURE — 76942 ECHO GUIDE FOR BIOPSY: CPT | Performed by: STUDENT IN AN ORGANIZED HEALTH CARE EDUCATION/TRAINING PROGRAM

## 2022-06-28 PROCEDURE — 88307 TISSUE EXAM BY PATHOLOGIST: CPT | Performed by: SURGERY

## 2022-06-28 RX ORDER — ONDANSETRON 2 MG/ML
INJECTION INTRAMUSCULAR; INTRAVENOUS AS NEEDED
Status: DISCONTINUED | OUTPATIENT
Start: 2022-06-28 | End: 2022-06-28 | Stop reason: SURG

## 2022-06-28 RX ORDER — HYDROMORPHONE HYDROCHLORIDE 1 MG/ML
0.6 INJECTION, SOLUTION INTRAMUSCULAR; INTRAVENOUS; SUBCUTANEOUS EVERY 5 MIN PRN
Status: DISCONTINUED | OUTPATIENT
Start: 2022-06-28 | End: 2022-06-28

## 2022-06-28 RX ORDER — ROCURONIUM BROMIDE 10 MG/ML
INJECTION, SOLUTION INTRAVENOUS AS NEEDED
Status: DISCONTINUED | OUTPATIENT
Start: 2022-06-28 | End: 2022-06-28 | Stop reason: SURG

## 2022-06-28 RX ORDER — HYDROMORPHONE HYDROCHLORIDE 1 MG/ML
0.2 INJECTION, SOLUTION INTRAMUSCULAR; INTRAVENOUS; SUBCUTANEOUS EVERY 5 MIN PRN
Status: DISCONTINUED | OUTPATIENT
Start: 2022-06-28 | End: 2022-06-28

## 2022-06-28 RX ORDER — LIDOCAINE HYDROCHLORIDE ANHYDROUS AND DEXTROSE MONOHYDRATE .8; 5 G/100ML; G/100ML
INJECTION, SOLUTION INTRAVENOUS CONTINUOUS PRN
Status: DISCONTINUED | OUTPATIENT
Start: 2022-06-28 | End: 2022-06-28 | Stop reason: SURG

## 2022-06-28 RX ORDER — KETAMINE HYDROCHLORIDE 50 MG/ML
INJECTION, SOLUTION, CONCENTRATE INTRAMUSCULAR; INTRAVENOUS AS NEEDED
Status: DISCONTINUED | OUTPATIENT
Start: 2022-06-28 | End: 2022-06-28 | Stop reason: SURG

## 2022-06-28 RX ORDER — EPHEDRINE SULFATE 50 MG/ML
INJECTION INTRAVENOUS AS NEEDED
Status: DISCONTINUED | OUTPATIENT
Start: 2022-06-28 | End: 2022-06-28 | Stop reason: SURG

## 2022-06-28 RX ORDER — LIDOCAINE AND PRILOCAINE 25; 25 MG/G; MG/G
CREAM TOPICAL ONCE
Status: COMPLETED | OUTPATIENT
Start: 2022-06-28 | End: 2022-06-28

## 2022-06-28 RX ORDER — CLINDAMYCIN PHOSPHATE 900 MG/50ML
900 INJECTION INTRAVENOUS ONCE
Status: COMPLETED | OUTPATIENT
Start: 2022-06-28 | End: 2022-06-28

## 2022-06-28 RX ORDER — GLYCOPYRROLATE 0.2 MG/ML
INJECTION, SOLUTION INTRAMUSCULAR; INTRAVENOUS AS NEEDED
Status: DISCONTINUED | OUTPATIENT
Start: 2022-06-28 | End: 2022-06-28 | Stop reason: SURG

## 2022-06-28 RX ORDER — METOCLOPRAMIDE HYDROCHLORIDE 5 MG/ML
INJECTION INTRAMUSCULAR; INTRAVENOUS AS NEEDED
Status: DISCONTINUED | OUTPATIENT
Start: 2022-06-28 | End: 2022-06-28 | Stop reason: SURG

## 2022-06-28 RX ORDER — DIAZEPAM 5 MG/1
5 TABLET ORAL AS NEEDED
Status: DISCONTINUED | OUTPATIENT
Start: 2022-06-28 | End: 2022-06-28 | Stop reason: HOSPADM

## 2022-06-28 RX ORDER — ACETAMINOPHEN 500 MG
1000 TABLET ORAL ONCE
Status: DISCONTINUED | OUTPATIENT
Start: 2022-06-28 | End: 2022-06-28 | Stop reason: HOSPADM

## 2022-06-28 RX ORDER — HYDROMORPHONE HYDROCHLORIDE 1 MG/ML
0.4 INJECTION, SOLUTION INTRAMUSCULAR; INTRAVENOUS; SUBCUTANEOUS EVERY 5 MIN PRN
Status: DISCONTINUED | OUTPATIENT
Start: 2022-06-28 | End: 2022-06-28

## 2022-06-28 RX ORDER — HYDROCODONE BITARTRATE AND ACETAMINOPHEN 5; 325 MG/1; MG/1
2 TABLET ORAL ONCE AS NEEDED
Status: DISCONTINUED | OUTPATIENT
Start: 2022-06-28 | End: 2022-06-28

## 2022-06-28 RX ORDER — NEOSTIGMINE METHYLSULFATE 1 MG/ML
INJECTION, SOLUTION INTRAVENOUS AS NEEDED
Status: DISCONTINUED | OUTPATIENT
Start: 2022-06-28 | End: 2022-06-28 | Stop reason: SURG

## 2022-06-28 RX ORDER — SODIUM CHLORIDE, SODIUM LACTATE, POTASSIUM CHLORIDE, CALCIUM CHLORIDE 600; 310; 30; 20 MG/100ML; MG/100ML; MG/100ML; MG/100ML
INJECTION, SOLUTION INTRAVENOUS CONTINUOUS
Status: DISCONTINUED | OUTPATIENT
Start: 2022-06-28 | End: 2022-06-28

## 2022-06-28 RX ORDER — HYDROCODONE BITARTRATE AND ACETAMINOPHEN 5; 325 MG/1; MG/1
1-2 TABLET ORAL EVERY 6 HOURS PRN
Qty: 40 TABLET | Refills: 0 | Status: SHIPPED | OUTPATIENT
Start: 2022-06-28

## 2022-06-28 RX ORDER — HYDROCODONE BITARTRATE AND ACETAMINOPHEN 5; 325 MG/1; MG/1
1 TABLET ORAL ONCE AS NEEDED
Status: DISCONTINUED | OUTPATIENT
Start: 2022-06-28 | End: 2022-06-28

## 2022-06-28 RX ORDER — DIPHENHYDRAMINE HYDROCHLORIDE 50 MG/ML
12.5 INJECTION INTRAMUSCULAR; INTRAVENOUS AS NEEDED
Status: DISCONTINUED | OUTPATIENT
Start: 2022-06-28 | End: 2022-06-28

## 2022-06-28 RX ORDER — ACETAMINOPHEN 500 MG
1000 TABLET ORAL ONCE AS NEEDED
Status: DISCONTINUED | OUTPATIENT
Start: 2022-06-28 | End: 2022-06-28

## 2022-06-28 RX ORDER — NALOXONE HYDROCHLORIDE 0.4 MG/ML
80 INJECTION, SOLUTION INTRAMUSCULAR; INTRAVENOUS; SUBCUTANEOUS AS NEEDED
Status: DISCONTINUED | OUTPATIENT
Start: 2022-06-28 | End: 2022-06-28

## 2022-06-28 RX ORDER — ROPIVACAINE HYDROCHLORIDE 5 MG/ML
INJECTION, SOLUTION EPIDURAL; INFILTRATION; PERINEURAL AS NEEDED
Status: DISCONTINUED | OUTPATIENT
Start: 2022-06-28 | End: 2022-06-28 | Stop reason: SURG

## 2022-06-28 RX ORDER — SCOLOPAMINE TRANSDERMAL SYSTEM 1 MG/1
1 PATCH, EXTENDED RELEASE TRANSDERMAL ONCE
Status: DISCONTINUED | OUTPATIENT
Start: 2022-06-28 | End: 2022-06-28 | Stop reason: HOSPADM

## 2022-06-28 RX ORDER — PROCHLORPERAZINE EDISYLATE 5 MG/ML
5 INJECTION INTRAMUSCULAR; INTRAVENOUS EVERY 8 HOURS PRN
Status: DISCONTINUED | OUTPATIENT
Start: 2022-06-28 | End: 2022-06-28

## 2022-06-28 RX ORDER — DEXAMETHASONE SODIUM PHOSPHATE 4 MG/ML
VIAL (ML) INJECTION AS NEEDED
Status: DISCONTINUED | OUTPATIENT
Start: 2022-06-28 | End: 2022-06-28 | Stop reason: SURG

## 2022-06-28 RX ORDER — SODIUM CHLORIDE 9 MG/ML
INJECTION INTRAVENOUS AS NEEDED
Status: DISCONTINUED | OUTPATIENT
Start: 2022-06-28 | End: 2022-06-28 | Stop reason: HOSPADM

## 2022-06-28 RX ORDER — ONDANSETRON 2 MG/ML
4 INJECTION INTRAMUSCULAR; INTRAVENOUS EVERY 6 HOURS PRN
Status: DISCONTINUED | OUTPATIENT
Start: 2022-06-28 | End: 2022-06-28

## 2022-06-28 RX ORDER — LIDOCAINE HYDROCHLORIDE 10 MG/ML
INJECTION, SOLUTION EPIDURAL; INFILTRATION; INTRACAUDAL; PERINEURAL AS NEEDED
Status: DISCONTINUED | OUTPATIENT
Start: 2022-06-28 | End: 2022-06-28 | Stop reason: SURG

## 2022-06-28 RX ORDER — MEPERIDINE HYDROCHLORIDE 25 MG/ML
12.5 INJECTION INTRAMUSCULAR; INTRAVENOUS; SUBCUTANEOUS AS NEEDED
Status: DISCONTINUED | OUTPATIENT
Start: 2022-06-28 | End: 2022-06-28

## 2022-06-28 RX ADMIN — DEXAMETHASONE SODIUM PHOSPHATE 8 MG: 4 MG/ML VIAL (ML) INJECTION at 13:23:00

## 2022-06-28 RX ADMIN — ROPIVACAINE HYDROCHLORIDE 10 ML: 5 INJECTION, SOLUTION EPIDURAL; INFILTRATION; PERINEURAL at 13:20:00

## 2022-06-28 RX ADMIN — SODIUM CHLORIDE, SODIUM LACTATE, POTASSIUM CHLORIDE, CALCIUM CHLORIDE: 600; 310; 30; 20 INJECTION, SOLUTION INTRAVENOUS at 14:37:00

## 2022-06-28 RX ADMIN — METOCLOPRAMIDE HYDROCHLORIDE 10 MG: 5 INJECTION INTRAMUSCULAR; INTRAVENOUS at 13:44:00

## 2022-06-28 RX ADMIN — LIDOCAINE HYDROCHLORIDE ANHYDROUS AND DEXTROSE MONOHYDRATE 2 MG/KG/HR: .8; 5 INJECTION, SOLUTION INTRAVENOUS at 13:22:00

## 2022-06-28 RX ADMIN — EPHEDRINE SULFATE 10 MG: 50 INJECTION INTRAVENOUS at 14:00:00

## 2022-06-28 RX ADMIN — KETAMINE HYDROCHLORIDE 50 MG: 50 INJECTION, SOLUTION, CONCENTRATE INTRAMUSCULAR; INTRAVENOUS at 13:21:00

## 2022-06-28 RX ADMIN — ROCURONIUM BROMIDE 10 MG: 10 INJECTION, SOLUTION INTRAVENOUS at 13:55:00

## 2022-06-28 RX ADMIN — ROPIVACAINE HYDROCHLORIDE 10 ML: 5 INJECTION, SOLUTION EPIDURAL; INFILTRATION; PERINEURAL at 13:16:00

## 2022-06-28 RX ADMIN — EPHEDRINE SULFATE 10 MG: 50 INJECTION INTRAVENOUS at 14:03:00

## 2022-06-28 RX ADMIN — GLYCOPYRROLATE 0.8 MG: 0.2 INJECTION, SOLUTION INTRAMUSCULAR; INTRAVENOUS at 14:49:00

## 2022-06-28 RX ADMIN — SODIUM CHLORIDE, SODIUM LACTATE, POTASSIUM CHLORIDE, CALCIUM CHLORIDE: 600; 310; 30; 20 INJECTION, SOLUTION INTRAVENOUS at 15:04:00

## 2022-06-28 RX ADMIN — NEOSTIGMINE METHYLSULFATE 5 MG: 1 INJECTION, SOLUTION INTRAVENOUS at 14:49:00

## 2022-06-28 RX ADMIN — ROCURONIUM BROMIDE 10 MG: 10 INJECTION, SOLUTION INTRAVENOUS at 14:21:00

## 2022-06-28 RX ADMIN — ROCURONIUM BROMIDE 40 MG: 10 INJECTION, SOLUTION INTRAVENOUS at 13:04:00

## 2022-06-28 RX ADMIN — CLINDAMYCIN PHOSPHATE 900 MG: 900 INJECTION INTRAVENOUS at 13:22:00

## 2022-06-28 RX ADMIN — EPHEDRINE SULFATE 10 MG: 50 INJECTION INTRAVENOUS at 13:32:00

## 2022-06-28 RX ADMIN — LIDOCAINE HYDROCHLORIDE 5 ML: 10 INJECTION, SOLUTION EPIDURAL; INFILTRATION; INTRACAUDAL; PERINEURAL at 13:04:00

## 2022-06-28 RX ADMIN — ONDANSETRON 4 MG: 2 INJECTION INTRAMUSCULAR; INTRAVENOUS at 14:29:00

## 2022-06-28 NOTE — BRIEF OP NOTE
Pre-Operative Diagnosis: Ductal carcinoma in situ (DCIS) of left breast [D05.12]     Post-Operative Diagnosis: Ductal carcinoma in situ (DCIS) of left breast [D05.12]      Procedure Performed:   Bilateral simple mastectomies, left lymphoscintigraphy, left sentinel lymph node biopsy,     Surgeon(s) and Role:     Casa Espitia MD - Primary    Assistant(s):  Surgical Assistant.: Aureliano Young CSA     Surgical Findings: L SLN negative on frozen section     Specimen: Bilateral breasts, L SLN     Estimated Blood Loss: Blood Output: 50 mL (6/28/2022  2:42 PM)    Shavon Pinto MD  6/28/2022  2:50 PM

## 2022-06-28 NOTE — ANESTHESIA PROCEDURE NOTES
Airway  Date/Time: 6/28/2022 1:07 PM  Urgency: elective    Airway not difficult    General Information and Staff    Patient location during procedure: OR  Anesthesiologist: Nataliya Parker MD  Performed: anesthesiologist     Indications and Patient Condition  Indications for airway management: anesthesia  Spontaneous Ventilation: absent  Sedation level: deep  Preoxygenated: yes  Patient position: sniffing  Mask difficulty assessment: 2 - vent by mask + OA or adjuvant +/- NMBA    Final Airway Details  Final airway type: endotracheal airway      Successful airway: ETT  Cuffed: yes   Successful intubation technique: direct laryngoscopy  Endotracheal tube insertion site: oral  Blade: Clementine  Blade size: #3  ETT size (mm): 7.0    Cormack-Lehane Classification: grade I - full view of glottis  Placement verified by: chest auscultation and capnometry   Measured from: lips  ETT to lips (cm): 22  Number of attempts at approach: 1

## 2022-06-28 NOTE — ANESTHESIA POSTPROCEDURE EVALUATION
[address] Patient Status:  Hospital Outpatient Surgery   Age/Gender 60year old female MRN [id number]   Location [address] Attending Mary Jules MD   Hosp Day # 0 PCP Jonny Huber MD       Anesthesia Post-op Note    Bilateral simple mastectomies, left lymphoscintigraphy, left sentinel lymph node biopsy,     Procedure Summary     Date: 06/28/22 Room / Location: [facility] OR 03 / 1404 [facility] OR    Anesthesia Start: 5766 Anesthesia Stop: 8160    Procedure: Bilateral simple mastectomies, left lymphoscintigraphy, left sentinel lymph node biopsy, (Bilateral Breast) Diagnosis:       Ductal carcinoma in situ (DCIS) of left breast      (Ductal carcinoma in situ (DCIS) of left breast [D05.12])    Surgeons: Mary Jules MD Anesthesiologist: Osmel Ball MD    Anesthesia Type: general, regional ASA Status: 3          Anesthesia Type: general, regional    Vitals Value Taken Time   /71 06/28/22 1523   Temp 97.4 06/28/22 1525   Pulse 96 06/28/22 1524   Resp 16 06/28/22 1524   SpO2 100 % 06/28/22 1524   Vitals shown include unvalidated device data. Patient Location: PACU    Anesthesia Type: general    Airway Patency: patent and extubated    Postop Pain Control: adequate    Mental Status: preanesthetic baseline    Nausea/Vomiting: none    Cardiopulmonary/Hydration status: stable euvolemic    Complications: no apparent anesthesia related complications    Postop vital signs: stable    Dental Exam: Unchanged from Preop    Patient to be discharged from PACU when criteria met.

## 2022-06-28 NOTE — ANESTHESIA PROCEDURE NOTES
Regional Block  Performed by: Michael Fitzpatrick MD  Authorized by: Michael Fitzpatrick MD       General Information and Staff    Start Time:  6/28/2022 1:12 PM  End Time:  6/28/2022 1:20 PM  Anesthesiologist:  Michael Fitzpatrick MD  Performed by: Anesthesiologist  Patient Location:  OR    Block Placement: Post Induction  Site Identification: real time ultrasound guided and image stored and retrievable    Block site/laterality marked before start: site marked  Reason for Block: at surgeon's request and post-op pain management    Preanesthetic Checklist: 2 patient identifers, IV checked, risks and benefits discussed, monitors and equipment checked, pre-op evaluation, timeout performed, anesthesia consent, sterile technique used, no prohibitive neurological deficits and no local skin infection at insertion site      Procedure Details    Patient Position:  Supine  Prep: ChloraPrep    Monitoring:  Cardiac monitor, continuous pulse ox and blood pressure cuff  Anesthesia block type: Serratus anterior plane block   Laterality:  Bilateral  Injection Technique:  Single-shot    Needle    Needle Type:  Short-bevel and echogenic  Needle Gauge:  21 G  Needle Length:  100 mm  Needle Localization:  Ultrasound guidance  Reason for Ultrasound Use: appropriate spread of the medication was noted in real time and no ultrasound evidence of intravascular and/or intraneural injection            Assessment    Injection Assessment:  Good spread noted, negative resistance, negative aspiration for heme, incremental injection and low pressure  Heart Rate Change: No    - Patient tolerated block procedure well without evidence of immediate block related complications.      Medications      Additional Comments    Medication:  Ropivacaine 0.25% 20mL x2

## 2022-07-06 ENCOUNTER — OFFICE VISIT (OUTPATIENT)
Dept: SURGERY | Facility: CLINIC | Age: 62
End: 2022-07-06
Payer: COMMERCIAL

## 2022-07-06 VITALS
DIASTOLIC BLOOD PRESSURE: 77 MMHG | OXYGEN SATURATION: 98 % | SYSTOLIC BLOOD PRESSURE: 146 MMHG | RESPIRATION RATE: 18 BRPM | TEMPERATURE: 99 F | HEART RATE: 88 BPM

## 2022-07-06 DIAGNOSIS — D05.12 DUCTAL CARCINOMA IN SITU (DCIS) OF LEFT BREAST: Primary | ICD-10-CM

## 2022-07-06 PROCEDURE — 3077F SYST BP >= 140 MM HG: CPT

## 2022-07-06 PROCEDURE — 99024 POSTOP FOLLOW-UP VISIT: CPT

## 2022-07-06 PROCEDURE — 3078F DIAST BP <80 MM HG: CPT

## 2022-07-11 ENCOUNTER — OFFICE VISIT (OUTPATIENT)
Dept: SURGERY | Facility: CLINIC | Age: 62
End: 2022-07-11
Payer: COMMERCIAL

## 2022-07-11 NOTE — PROGRESS NOTES
The patient presents today for KENDRICK drain removal  Per patient's written record, right side KENDRICK drain with less than 30cc for two consecutive days. right drain was removed due to low output and the patient tolerated this well. The left drain will remain in place until the output is < 30 cc for two days consecutively. All incision care and showering instructions reviewed as well as signs of infection and reasons to contact our office. We discussed activity and lift restrictions of no greater than 10 pounds and no repetitive overhead motions. I encouraged continued compression until her next evaluation. She was encouraged to refrain from driving if taking narcotic pain medications. She will call the office to have her left drain removed when the output is lower. She will see Dr. Cortes Preston for a follow up appointment on 7/20/2022.

## 2022-07-12 ENCOUNTER — OFFICE VISIT (OUTPATIENT)
Dept: HEMATOLOGY/ONCOLOGY | Facility: HOSPITAL | Age: 62
End: 2022-07-12
Attending: INTERNAL MEDICINE
Payer: COMMERCIAL

## 2022-07-12 VITALS
RESPIRATION RATE: 18 BRPM | HEIGHT: 64 IN | HEART RATE: 77 BPM | DIASTOLIC BLOOD PRESSURE: 74 MMHG | BODY MASS INDEX: 34.83 KG/M2 | WEIGHT: 204 LBS | TEMPERATURE: 98 F | OXYGEN SATURATION: 98 % | SYSTOLIC BLOOD PRESSURE: 142 MMHG

## 2022-07-12 DIAGNOSIS — D05.12 DUCTAL CARCINOMA IN SITU (DCIS) OF LEFT BREAST: Primary | ICD-10-CM

## 2022-07-12 PROCEDURE — 99213 OFFICE O/P EST LOW 20 MIN: CPT | Performed by: INTERNAL MEDICINE

## 2022-07-15 ENCOUNTER — NURSE ONLY (OUTPATIENT)
Dept: SURGERY | Facility: CLINIC | Age: 62
End: 2022-07-15
Payer: COMMERCIAL

## 2022-07-15 DIAGNOSIS — D05.12 DUCTAL CARCINOMA IN SITU (DCIS) OF LEFT BREAST: Primary | ICD-10-CM

## 2022-07-15 NOTE — PROGRESS NOTES
The patient presents today for drain removal  Left breast drain was removed due to low output. Per pt's written record, the output has remained < 30 cc for two days consecutively. Explained drain removal process to pt. Sutures removed, drain decompressed & pulled. np residual output post-removal.  Pt tolerated well  Covered with sterile gauze and Tegaderm. Pt instructed to keep covered and dry until scabbed over/healed. All incision care and showering instructions reviewed as well as signs of infection and reasons to contact our office. She will follow up with Dr. Kel Zambrano on 7/20 for a post op visit. Pt verbalized understanding. All questions answered. Pt encouraged to call office with any further questions or concerns.

## 2022-07-20 ENCOUNTER — OFFICE VISIT (OUTPATIENT)
Dept: SURGERY | Facility: CLINIC | Age: 62
End: 2022-07-20
Payer: COMMERCIAL

## 2022-07-20 VITALS
RESPIRATION RATE: 16 BRPM | HEIGHT: 64 IN | DIASTOLIC BLOOD PRESSURE: 82 MMHG | TEMPERATURE: 99 F | HEART RATE: 77 BPM | WEIGHT: 203.19 LBS | BODY MASS INDEX: 34.69 KG/M2 | OXYGEN SATURATION: 97 % | SYSTOLIC BLOOD PRESSURE: 155 MMHG

## 2022-07-20 DIAGNOSIS — N64.89 SEROMA OF BREAST: ICD-10-CM

## 2022-07-20 DIAGNOSIS — D05.12 DUCTAL CARCINOMA IN SITU (DCIS) OF LEFT BREAST: Primary | ICD-10-CM

## 2022-07-20 PROCEDURE — 3008F BODY MASS INDEX DOCD: CPT | Performed by: SURGERY

## 2022-07-20 PROCEDURE — 3079F DIAST BP 80-89 MM HG: CPT | Performed by: SURGERY

## 2022-07-20 PROCEDURE — 87070 CULTURE OTHR SPECIMN AEROBIC: CPT | Performed by: SURGERY

## 2022-07-20 PROCEDURE — 87205 SMEAR GRAM STAIN: CPT | Performed by: SURGERY

## 2022-07-20 PROCEDURE — 99024 POSTOP FOLLOW-UP VISIT: CPT | Performed by: SURGERY

## 2022-07-20 PROCEDURE — 3077F SYST BP >= 140 MM HG: CPT | Performed by: SURGERY

## 2022-07-26 ENCOUNTER — OFFICE VISIT (OUTPATIENT)
Dept: SURGERY | Facility: CLINIC | Age: 62
End: 2022-07-26
Payer: COMMERCIAL

## 2022-07-26 VITALS
WEIGHT: 199.38 LBS | HEIGHT: 64 IN | OXYGEN SATURATION: 98 % | SYSTOLIC BLOOD PRESSURE: 133 MMHG | BODY MASS INDEX: 34.04 KG/M2 | HEART RATE: 77 BPM | TEMPERATURE: 98 F | RESPIRATION RATE: 18 BRPM | DIASTOLIC BLOOD PRESSURE: 81 MMHG

## 2022-07-26 DIAGNOSIS — N64.89 SEROMA OF BREAST: ICD-10-CM

## 2022-07-26 DIAGNOSIS — D05.12 DUCTAL CARCINOMA IN SITU (DCIS) OF LEFT BREAST: Primary | ICD-10-CM

## 2022-07-26 PROCEDURE — 3079F DIAST BP 80-89 MM HG: CPT | Performed by: SURGERY

## 2022-07-26 PROCEDURE — 3075F SYST BP GE 130 - 139MM HG: CPT | Performed by: SURGERY

## 2022-07-26 PROCEDURE — 99024 POSTOP FOLLOW-UP VISIT: CPT | Performed by: SURGERY

## 2022-07-26 PROCEDURE — 3008F BODY MASS INDEX DOCD: CPT | Performed by: SURGERY

## 2022-07-28 ENCOUNTER — OFFICE VISIT (OUTPATIENT)
Dept: PHYSICAL THERAPY | Facility: HOSPITAL | Age: 62
End: 2022-07-28
Attending: SURGERY
Payer: COMMERCIAL

## 2022-08-09 ENCOUNTER — OFFICE VISIT (OUTPATIENT)
Dept: HEMATOLOGY/ONCOLOGY | Facility: HOSPITAL | Age: 62
End: 2022-08-09
Attending: NURSE PRACTITIONER
Payer: COMMERCIAL

## 2022-08-09 DIAGNOSIS — Z71.9 COUNSELING, UNSPECIFIED: ICD-10-CM

## 2022-08-09 DIAGNOSIS — Z85.3 PERSONAL HISTORY OF BREAST CANCER: Primary | ICD-10-CM

## 2022-08-09 DIAGNOSIS — Z08 ENCOUNTER FOR FOLLOW-UP EXAMINATION AFTER COMPLETED TREATMENT FOR MALIGNANT NEOPLASM: ICD-10-CM

## 2022-08-09 PROCEDURE — 99215 OFFICE O/P EST HI 40 MIN: CPT | Performed by: NURSE PRACTITIONER

## 2022-08-09 PROCEDURE — 99417 PROLNG OP E/M EACH 15 MIN: CPT | Performed by: NURSE PRACTITIONER

## 2022-08-09 NOTE — PROGRESS NOTES
I met with Susan Heck for a Survivorship Clinic visit to provide a survivorship care plan (SCP) and information related to post-treatment care. She has a diagnosis of left ductal carcinoma in situ (DCIS), diagnosed in 6/2022. She had a prior history of DCIS of the right breast in 2004, was treated with a right breast lumpectomy, radiation therapy and 5 years of Tamoxifen under the care of Dr. Phil Kitchen. Genetic testing done in 6/2022 was negative. On 6/28/2022 she had a bilateral mastectomy with left sentinel lymph node biopsy and prophylactic mastectomy without reconstruction. She had two negative lymph nodes, staging was  pTis, pN0, cM0, grade 3, ER+, Stage 0. (See Oncology Treatment Summary SCP for details). Current condition/issues: Susan Heck is doing well post-treatment. She reports feeling grateful for having a Stage 0 cancer. She has recovered well from surgery except for recurrent post-op surgical site seromas. She has good ROM and has quickly resumed pre-surgery activities. She did significant yard work over the weekend. She notes discomfort along bilateral surgical sites and axilla, described as her \"skin hurting\". She uses various types of topical lidocaine that provides temporary relief to the skin in those areas. She notes the discomfort is worse in the left axilla area where the drain was pulled. She has massage techniques that she can use. She has no swelling in either arm, but does note return of the seroma in the right chest wall and will see Dr. Sherly Ramos tomorrow. She is wearing prosthetic foam pieces that she created and hopes to get post-mastectomy surgical bras and order for prosthetics at tomorrow visit. She denies having concerns with the cosmetic changes from bilateral mastectomy. She is very active and does outside activity and inside activity with her treadmill and exercise bike. She gets [id number] steps/day.   She tries to eat healthy, but has areas to improve. Current BMI is 34. She admits to having very good support through her diagnosis and post-treatment. Survivorship Care Plan and letter: She was provided a copy of the SCP and a letter that outlined the purpose of the visit and plan. We reviewed all elements of both documents. She is aware that a letter and SCP will be sent to her PCP, Dr. Brielle Kruger. Reviewed Cancer Surveillance (office visits) and that Dr. Maranda Sheth will oversee this care. She will see him every 6-12 months for the first 5 years, then as directed. She will see him on 1/10/23. She will see Dr. Arabella Tijerina tomorrow, then every 6 months for 2 years. Reviewed Schedule of other clinic visits with Primary Care and specialists: Dr. Brielle Kruger will continue to manage all general health care recommended for age and gender including cancer screening tests. She is due for colon FIT testing in 12/2022. She has not had cervical or gynecologic screening for awhile. She was encouraged to continue to see specialists at usual intervals. Reviewed concerning symptoms that she should report to any Provider. Reviewed possible late and long-term effects related to the treatment that was received. We reviewed care of the surgical arm. She had reviewed this with PT Lynda Teixeira) and will discuss again at next visit in October. She was provided a written handout on precautions for lymphedema. Reviewed common cancer survivor issues and resources available. She appears to be doing very well. Various survivorship resources were provided to use going forward as needed (see below). Reviewed Lifestyle/behaviors that can affect ongoing health, including the risk for the cancer coming back or developing another cancer. We reviewed importance of physical activity and a plant based diet. We reviewed resources for better nutrition and weight loss. We reviewed skin care and sun safety.      [facility] fabricio a take-home folder that included the following survivorship resources:   -SCP and patient letter  -Breast Survivorship [facility] in [facility], virtual programming available and includes: education, support groups, special programs, nutrition and exercise classes, movement classes, mind/body programs, survivorship programs, individual counseling  -[place] [facility]  -Healthy Weight, Healthy You-September  -Cosmetic resources, Knitted Knockers sample provided  -[employer]gov handout-nutrition, physical activity  -ACS Cancer Screening Guidelines  -Websites: [address] for your Life, Living Beyond Breast Cancer, Facebook: Josiane Mario; [facility] Survivorship Network; [facility] for [address] [place] [address] was given the opportunity to ask questions. She had a few questions and verbalized understanding of the information we discussed today. She appears to have a good understanding of current plan of care. My total time spent caring for the patient on the day of the encounter: 95 minutes (10 minutes reviewing chart, 75 minutes of face to face counseling regarding survivorship education, review of care plan and additional resources and 10 minutes of documentation). She was encouraged to call with any further questions.    Elaine Benitez APRN

## 2022-08-10 ENCOUNTER — OFFICE VISIT (OUTPATIENT)
Dept: SURGERY | Facility: CLINIC | Age: 62
End: 2022-08-10
Payer: COMMERCIAL

## 2022-08-10 VITALS
SYSTOLIC BLOOD PRESSURE: 136 MMHG | HEART RATE: 77 BPM | RESPIRATION RATE: 18 BRPM | DIASTOLIC BLOOD PRESSURE: 76 MMHG | OXYGEN SATURATION: 96 % | TEMPERATURE: 99 F

## 2022-08-10 DIAGNOSIS — N64.89 SEROMA OF BREAST: ICD-10-CM

## 2022-08-10 DIAGNOSIS — D05.12 DUCTAL CARCINOMA IN SITU (DCIS) OF LEFT BREAST: Primary | ICD-10-CM

## 2022-08-10 PROCEDURE — 99024 POSTOP FOLLOW-UP VISIT: CPT | Performed by: SURGERY

## 2022-08-10 PROCEDURE — 3078F DIAST BP <80 MM HG: CPT | Performed by: SURGERY

## 2022-08-10 PROCEDURE — 3075F SYST BP GE 130 - 139MM HG: CPT | Performed by: SURGERY

## 2022-09-07 ENCOUNTER — OFFICE VISIT (OUTPATIENT)
Dept: SURGERY | Facility: CLINIC | Age: 62
End: 2022-09-07
Payer: COMMERCIAL

## 2022-09-07 VITALS
RESPIRATION RATE: 18 BRPM | OXYGEN SATURATION: 96 % | SYSTOLIC BLOOD PRESSURE: 139 MMHG | TEMPERATURE: 98 F | HEART RATE: 93 BPM | DIASTOLIC BLOOD PRESSURE: 66 MMHG | BODY MASS INDEX: 35 KG/M2 | WEIGHT: 201.81 LBS

## 2022-09-07 DIAGNOSIS — N64.89 SEROMA OF BREAST: ICD-10-CM

## 2022-09-07 DIAGNOSIS — Z90.13 ABSENCE OF BOTH BREASTS: ICD-10-CM

## 2022-09-07 DIAGNOSIS — D05.12 DUCTAL CARCINOMA IN SITU (DCIS) OF LEFT BREAST: Primary | ICD-10-CM

## 2022-09-07 PROCEDURE — 3075F SYST BP GE 130 - 139MM HG: CPT | Performed by: SURGERY

## 2022-09-07 PROCEDURE — 3078F DIAST BP <80 MM HG: CPT | Performed by: SURGERY

## 2022-09-07 PROCEDURE — 99024 POSTOP FOLLOW-UP VISIT: CPT | Performed by: SURGERY

## 2022-09-28 DIAGNOSIS — R10.32 LEFT LOWER QUADRANT ABDOMINAL PAIN: Primary | ICD-10-CM

## 2022-09-28 DIAGNOSIS — Z87.19 HISTORY OF DIVERTICULITIS: ICD-10-CM

## 2022-10-01 ENCOUNTER — APPOINTMENT (OUTPATIENT)
Dept: CT IMAGING | Facility: HOSPITAL | Age: 62
End: 2022-10-01
Attending: NURSE PRACTITIONER
Payer: COMMERCIAL

## 2022-10-01 ENCOUNTER — HOSPITAL ENCOUNTER (EMERGENCY)
Facility: HOSPITAL | Age: 62
Discharge: HOME | End: 2022-10-01
Payer: COMMERCIAL

## 2022-10-01 VITALS
HEIGHT: 64 IN | DIASTOLIC BLOOD PRESSURE: 70 MMHG | TEMPERATURE: 98 F | WEIGHT: 200 LBS | SYSTOLIC BLOOD PRESSURE: 141 MMHG | OXYGEN SATURATION: 98 % | RESPIRATION RATE: 18 BRPM | HEART RATE: 91 BPM | BODY MASS INDEX: 34.15 KG/M2

## 2022-10-01 DIAGNOSIS — K57.92 ACUTE DIVERTICULITIS: Primary | ICD-10-CM

## 2022-10-01 LAB
ALBUMIN SERPL-MCNC: 3.4 G/DL (ref 3.4–5)
ALBUMIN/GLOB SERPL: 0.9 {RATIO} (ref 1–2)
ALP LIVER SERPL-CCNC: 65 U/L
ALT SERPL-CCNC: 63 U/L
ANION GAP SERPL CALC-SCNC: 8 MMOL/L (ref 0–18)
AST SERPL-CCNC: 38 U/L (ref 15–37)
BASOPHILS # BLD AUTO: 0.04 X10(3) UL (ref 0–0.2)
BASOPHILS NFR BLD AUTO: 0.3 %
BILIRUB SERPL-MCNC: 0.3 MG/DL (ref 0.1–2)
BILIRUB UR QL: NEGATIVE
BUN BLD-MCNC: 10 MG/DL (ref 7–18)
BUN/CREAT SERPL: 12.3 (ref 10–20)
CALCIUM BLD-MCNC: 9.1 MG/DL (ref 8.5–10.1)
CHLORIDE SERPL-SCNC: 107 MMOL/L (ref 98–112)
CLARITY UR: CLEAR
CO2 SERPL-SCNC: 26 MMOL/L (ref 21–32)
COLOR UR: YELLOW
CREAT BLD-MCNC: 0.81 MG/DL
DEPRECATED RDW RBC AUTO: 40.8 FL (ref 35.1–46.3)
EOSINOPHIL # BLD AUTO: 0.3 X10(3) UL (ref 0–0.7)
EOSINOPHIL NFR BLD AUTO: 2.5 %
ERYTHROCYTE [DISTWIDTH] IN BLOOD BY AUTOMATED COUNT: 12 % (ref 11–15)
GFR SERPLBLD BASED ON 1.73 SQ M-ARVRAT: 82 ML/MIN/1.73M2 (ref 60–?)
GLOBULIN PLAS-MCNC: 3.7 G/DL (ref 2.8–4.4)
GLUCOSE BLD-MCNC: 107 MG/DL (ref 70–99)
GLUCOSE UR-MCNC: NEGATIVE MG/DL
HCT VFR BLD AUTO: 40.4 %
HGB BLD-MCNC: 13.2 G/DL
HGB UR QL STRIP.AUTO: NEGATIVE
IMM GRANULOCYTES # BLD AUTO: 0.03 X10(3) UL (ref 0–1)
IMM GRANULOCYTES NFR BLD: 0.2 %
KETONES UR-MCNC: NEGATIVE MG/DL
LEUKOCYTE ESTERASE UR QL STRIP.AUTO: NEGATIVE
LIPASE SERPL-CCNC: 121 U/L (ref 73–393)
LYMPHOCYTES # BLD AUTO: 1.86 X10(3) UL (ref 1–4)
LYMPHOCYTES NFR BLD AUTO: 15.4 %
MCH RBC QN AUTO: 30.3 PG (ref 26–34)
MCHC RBC AUTO-ENTMCNC: 32.7 G/DL (ref 31–37)
MCV RBC AUTO: 92.9 FL
MONOCYTES # BLD AUTO: 0.93 X10(3) UL (ref 0.1–1)
MONOCYTES NFR BLD AUTO: 7.7 %
NEUTROPHILS # BLD AUTO: 8.94 X10 (3) UL (ref 1.5–7.7)
NEUTROPHILS # BLD AUTO: 8.94 X10(3) UL (ref 1.5–7.7)
NEUTROPHILS NFR BLD AUTO: 73.9 %
NITRITE UR QL STRIP.AUTO: NEGATIVE
OSMOLALITY SERPL CALC.SUM OF ELEC: 292 MOSM/KG (ref 275–295)
PH UR: 7 [PH] (ref 5–8)
PLATELET # BLD AUTO: 345 10(3)UL (ref 150–450)
POTASSIUM SERPL-SCNC: 3.7 MMOL/L (ref 3.5–5.1)
PROT SERPL-MCNC: 7.1 G/DL (ref 6.4–8.2)
PROT UR-MCNC: NEGATIVE MG/DL
RBC # BLD AUTO: 4.35 X10(6)UL
SODIUM SERPL-SCNC: 141 MMOL/L (ref 136–145)
SP GR UR STRIP: 1.01 (ref 1–1.03)
UROBILINOGEN UR STRIP-ACNC: 0.2
WBC # BLD AUTO: 12.1 X10(3) UL (ref 4–11)

## 2022-10-01 PROCEDURE — 99284 EMERGENCY DEPT VISIT MOD MDM: CPT

## 2022-10-01 PROCEDURE — 96361 HYDRATE IV INFUSION ADD-ON: CPT

## 2022-10-01 PROCEDURE — 74177 CT ABD & PELVIS W/CONTRAST: CPT | Performed by: NURSE PRACTITIONER

## 2022-10-01 PROCEDURE — 96360 HYDRATION IV INFUSION INIT: CPT

## 2022-10-01 PROCEDURE — 81003 URINALYSIS AUTO W/O SCOPE: CPT | Performed by: NURSE PRACTITIONER

## 2022-10-01 PROCEDURE — 80053 COMPREHEN METABOLIC PANEL: CPT | Performed by: NURSE PRACTITIONER

## 2022-10-01 PROCEDURE — 85025 COMPLETE CBC W/AUTO DIFF WBC: CPT | Performed by: NURSE PRACTITIONER

## 2022-10-01 PROCEDURE — 83690 ASSAY OF LIPASE: CPT | Performed by: NURSE PRACTITIONER

## 2022-10-01 RX ORDER — LEVOFLOXACIN 500 MG/1
500 TABLET, FILM COATED ORAL DAILY
Qty: 10 TABLET | Refills: 0 | Status: SHIPPED | OUTPATIENT
Start: 2022-10-01 | End: 2022-10-11

## 2022-10-01 RX ORDER — METRONIDAZOLE 500 MG/1
500 TABLET ORAL 3 TIMES DAILY
Qty: 30 TABLET | Refills: 0 | Status: SHIPPED | OUTPATIENT
Start: 2022-10-01 | End: 2022-10-11

## 2022-10-01 RX ORDER — HYDROCODONE BITARTRATE AND ACETAMINOPHEN 5; 325 MG/1; MG/1
1-2 TABLET ORAL EVERY 6 HOURS PRN
Qty: 10 TABLET | Refills: 0 | Status: SHIPPED | OUTPATIENT
Start: 2022-10-01 | End: 2022-10-06

## 2022-10-01 NOTE — ED INITIAL ASSESSMENT (HPI)
Pt to the ed for llq abd pain and nausea  Was tx for diverticulitis 9/21 with keflex, but after finishing course of abx, started to have worsening symptoms again

## 2022-10-12 ENCOUNTER — OFFICE VISIT (OUTPATIENT)
Dept: PHYSICAL THERAPY | Facility: HOSPITAL | Age: 62
End: 2022-10-12
Attending: SURGERY
Payer: COMMERCIAL

## 2022-10-12 DIAGNOSIS — Z90.13 S/P MASTECTOMY, BILATERAL: ICD-10-CM

## 2022-10-12 DIAGNOSIS — C50.919 BREAST CANCER (HCC): Primary | ICD-10-CM

## 2023-01-10 ENCOUNTER — OFFICE VISIT (OUTPATIENT)
Dept: HEMATOLOGY/ONCOLOGY | Facility: HOSPITAL | Age: 63
End: 2023-01-10
Attending: INTERNAL MEDICINE
Payer: COMMERCIAL

## 2023-01-10 VITALS
HEART RATE: 98 BPM | BODY MASS INDEX: 34.94 KG/M2 | HEIGHT: 64 IN | OXYGEN SATURATION: 96 % | TEMPERATURE: 98 F | SYSTOLIC BLOOD PRESSURE: 146 MMHG | RESPIRATION RATE: 20 BRPM | DIASTOLIC BLOOD PRESSURE: 72 MMHG | WEIGHT: 204.63 LBS

## 2023-01-10 DIAGNOSIS — D05.12 DUCTAL CARCINOMA IN SITU (DCIS) OF LEFT BREAST: Primary | ICD-10-CM

## 2023-01-10 PROCEDURE — 99213 OFFICE O/P EST LOW 20 MIN: CPT | Performed by: INTERNAL MEDICINE

## 2023-01-16 ENCOUNTER — OFFICE VISIT (OUTPATIENT)
Facility: CLINIC | Age: 63
End: 2023-01-16

## 2023-01-16 VITALS
HEART RATE: 87 BPM | WEIGHT: 203.69 LBS | BODY MASS INDEX: 34.77 KG/M2 | DIASTOLIC BLOOD PRESSURE: 81 MMHG | SYSTOLIC BLOOD PRESSURE: 132 MMHG | HEIGHT: 64 IN

## 2023-01-16 DIAGNOSIS — Z12.11 SCREENING FOR COLORECTAL CANCER: ICD-10-CM

## 2023-01-16 DIAGNOSIS — Z12.12 SCREENING FOR COLORECTAL CANCER: ICD-10-CM

## 2023-01-16 DIAGNOSIS — Z87.19 HISTORY OF COLONIC DIVERTICULITIS: Primary | ICD-10-CM

## 2023-01-16 DIAGNOSIS — K21.9 GASTROESOPHAGEAL REFLUX DISEASE, UNSPECIFIED WHETHER ESOPHAGITIS PRESENT: ICD-10-CM

## 2023-01-16 PROCEDURE — 99204 OFFICE O/P NEW MOD 45 MIN: CPT | Performed by: INTERNAL MEDICINE

## 2023-01-16 PROCEDURE — 3008F BODY MASS INDEX DOCD: CPT | Performed by: INTERNAL MEDICINE

## 2023-01-16 PROCEDURE — 3075F SYST BP GE 130 - 139MM HG: CPT | Performed by: INTERNAL MEDICINE

## 2023-01-16 PROCEDURE — 3079F DIAST BP 80-89 MM HG: CPT | Performed by: INTERNAL MEDICINE

## 2023-01-16 RX ORDER — HYDROCHLOROTHIAZIDE 12.5 MG/1
12.5 TABLET ORAL
COMMUNITY
Start: 2022-11-06

## 2023-03-08 ENCOUNTER — OFFICE VISIT (OUTPATIENT)
Dept: SURGERY | Facility: CLINIC | Age: 63
End: 2023-03-08
Payer: COMMERCIAL

## 2023-03-08 VITALS
OXYGEN SATURATION: 97 % | HEART RATE: 85 BPM | BODY MASS INDEX: 33.8 KG/M2 | HEIGHT: 64 IN | SYSTOLIC BLOOD PRESSURE: 134 MMHG | TEMPERATURE: 98 F | DIASTOLIC BLOOD PRESSURE: 70 MMHG | WEIGHT: 198 LBS | RESPIRATION RATE: 18 BRPM

## 2023-03-08 DIAGNOSIS — N64.89 SEROMA OF BREAST: ICD-10-CM

## 2023-03-08 DIAGNOSIS — D05.12 DUCTAL CARCINOMA IN SITU (DCIS) OF LEFT BREAST: Primary | ICD-10-CM

## 2023-03-08 DIAGNOSIS — M25.612 DECREASED RANGE OF MOTION OF LEFT SHOULDER: ICD-10-CM

## 2023-03-08 PROCEDURE — 3008F BODY MASS INDEX DOCD: CPT | Performed by: SURGERY

## 2023-03-08 PROCEDURE — 3075F SYST BP GE 130 - 139MM HG: CPT | Performed by: SURGERY

## 2023-03-08 PROCEDURE — 10160 PNXR ASPIR ABSC HMTMA BULLA: CPT | Performed by: SURGERY

## 2023-03-08 PROCEDURE — 99213 OFFICE O/P EST LOW 20 MIN: CPT | Performed by: SURGERY

## 2023-03-08 PROCEDURE — 3078F DIAST BP <80 MM HG: CPT | Performed by: SURGERY

## 2023-03-27 ENCOUNTER — LAB ENCOUNTER (OUTPATIENT)
Dept: LAB | Facility: HOSPITAL | Age: 63
End: 2023-03-27
Attending: INTERNAL MEDICINE
Payer: COMMERCIAL

## 2023-03-27 DIAGNOSIS — Z00.00 ROUTINE GENERAL MEDICAL EXAMINATION AT A HEALTH CARE FACILITY: Primary | ICD-10-CM

## 2023-03-27 LAB
ALBUMIN SERPL-MCNC: 3.5 G/DL (ref 3.4–5)
ALBUMIN/GLOB SERPL: 0.9 {RATIO} (ref 1–2)
ALP LIVER SERPL-CCNC: 58 U/L
ALT SERPL-CCNC: 39 U/L
ANION GAP SERPL CALC-SCNC: 5 MMOL/L (ref 0–18)
BASOPHILS # BLD AUTO: 0.02 X10(3) UL (ref 0–0.2)
BASOPHILS NFR BLD AUTO: 0.4 %
BILIRUB SERPL-MCNC: 0.4 MG/DL (ref 0.1–2)
BILIRUB UR QL: NEGATIVE
BUN BLD-MCNC: 13 MG/DL (ref 7–18)
BUN/CREAT SERPL: 18.6 (ref 10–20)
CALCIUM BLD-MCNC: 9.3 MG/DL (ref 8.5–10.1)
CHLORIDE SERPL-SCNC: 107 MMOL/L (ref 98–112)
CHOLEST SERPL-MCNC: 246 MG/DL (ref ?–200)
CLARITY UR: CLEAR
CO2 SERPL-SCNC: 28 MMOL/L (ref 21–32)
CREAT BLD-MCNC: 0.7 MG/DL
DEPRECATED RDW RBC AUTO: 39.8 FL (ref 35.1–46.3)
EOSINOPHIL # BLD AUTO: 0.09 X10(3) UL (ref 0–0.7)
EOSINOPHIL NFR BLD AUTO: 1.8 %
ERYTHROCYTE [DISTWIDTH] IN BLOOD BY AUTOMATED COUNT: 11.9 % (ref 11–15)
EST. AVERAGE GLUCOSE BLD GHB EST-MCNC: 128 MG/DL (ref 68–126)
FASTING PATIENT LIPID ANSWER: YES
FASTING STATUS PATIENT QL REPORTED: YES
GFR SERPLBLD BASED ON 1.73 SQ M-ARVRAT: 97 ML/MIN/1.73M2 (ref 60–?)
GLOBULIN PLAS-MCNC: 3.8 G/DL (ref 2.8–4.4)
GLUCOSE BLD-MCNC: 117 MG/DL (ref 70–99)
GLUCOSE UR-MCNC: NORMAL MG/DL
HBA1C MFR BLD: 6.1 % (ref ?–5.7)
HCT VFR BLD AUTO: 38.1 %
HDLC SERPL-MCNC: 49 MG/DL (ref 40–59)
HGB BLD-MCNC: 12.9 G/DL
HGB UR QL STRIP.AUTO: NEGATIVE
IMM GRANULOCYTES # BLD AUTO: 0.02 X10(3) UL (ref 0–1)
IMM GRANULOCYTES NFR BLD: 0.4 %
KETONES UR-MCNC: NEGATIVE MG/DL
LDLC SERPL CALC-MCNC: 163 MG/DL (ref ?–100)
LEUKOCYTE ESTERASE UR QL STRIP.AUTO: NEGATIVE
LYMPHOCYTES # BLD AUTO: 1.63 X10(3) UL (ref 1–4)
LYMPHOCYTES NFR BLD AUTO: 32.2 %
MCH RBC QN AUTO: 30.7 PG (ref 26–34)
MCHC RBC AUTO-ENTMCNC: 33.9 G/DL (ref 31–37)
MCV RBC AUTO: 90.7 FL
MONOCYTES # BLD AUTO: 0.4 X10(3) UL (ref 0.1–1)
MONOCYTES NFR BLD AUTO: 7.9 %
NEUTROPHILS # BLD AUTO: 2.9 X10 (3) UL (ref 1.5–7.7)
NEUTROPHILS # BLD AUTO: 2.9 X10(3) UL (ref 1.5–7.7)
NEUTROPHILS NFR BLD AUTO: 57.3 %
NITRITE UR QL STRIP.AUTO: NEGATIVE
NONHDLC SERPL-MCNC: 197 MG/DL (ref ?–130)
OSMOLALITY SERPL CALC.SUM OF ELEC: 291 MOSM/KG (ref 275–295)
PH UR: 7 [PH] (ref 5–8)
PLATELET # BLD AUTO: 311 10(3)UL (ref 150–450)
PROT SERPL-MCNC: 7.3 G/DL (ref 6.4–8.2)
PROT UR-MCNC: NEGATIVE MG/DL
RBC # BLD AUTO: 4.2 X10(6)UL
SODIUM SERPL-SCNC: 140 MMOL/L (ref 136–145)
SP GR UR STRIP: 1.01 (ref 1–1.03)
TRIGL SERPL-MCNC: 184 MG/DL (ref 30–149)
TSI SER-ACNC: 1.63 MIU/ML (ref 0.36–3.74)
UROBILINOGEN UR STRIP-ACNC: NORMAL
VLDLC SERPL CALC-MCNC: 36 MG/DL (ref 0–30)
WBC # BLD AUTO: 5.1 X10(3) UL (ref 4–11)

## 2023-03-27 PROCEDURE — 80061 LIPID PANEL: CPT

## 2023-03-27 PROCEDURE — 83036 HEMOGLOBIN GLYCOSYLATED A1C: CPT

## 2023-03-27 PROCEDURE — 85025 COMPLETE CBC W/AUTO DIFF WBC: CPT

## 2023-03-27 PROCEDURE — 36415 COLL VENOUS BLD VENIPUNCTURE: CPT

## 2023-03-27 PROCEDURE — 80053 COMPREHEN METABOLIC PANEL: CPT

## 2023-03-27 PROCEDURE — 84443 ASSAY THYROID STIM HORMONE: CPT

## 2023-05-05 ENCOUNTER — APPOINTMENT (OUTPATIENT)
Dept: HEMATOLOGY/ONCOLOGY | Facility: HOSPITAL | Age: 63
End: 2023-05-05
Attending: INTERNAL MEDICINE
Payer: COMMERCIAL

## 2023-07-10 ENCOUNTER — OFFICE VISIT (OUTPATIENT)
Dept: HEMATOLOGY/ONCOLOGY | Facility: HOSPITAL | Age: 63
End: 2023-07-10
Attending: INTERNAL MEDICINE
Payer: COMMERCIAL

## 2023-07-10 VITALS
WEIGHT: 204.63 LBS | OXYGEN SATURATION: 98 % | HEART RATE: 91 BPM | RESPIRATION RATE: 18 BRPM | SYSTOLIC BLOOD PRESSURE: 140 MMHG | HEIGHT: 64 IN | TEMPERATURE: 98 F | BODY MASS INDEX: 34.94 KG/M2 | DIASTOLIC BLOOD PRESSURE: 68 MMHG

## 2023-07-10 DIAGNOSIS — D05.12 DUCTAL CARCINOMA IN SITU (DCIS) OF LEFT BREAST: Primary | ICD-10-CM

## 2023-07-10 PROCEDURE — 99213 OFFICE O/P EST LOW 20 MIN: CPT | Performed by: INTERNAL MEDICINE

## 2023-07-10 RX ORDER — ATORVASTATIN CALCIUM 40 MG/1
TABLET, FILM COATED ORAL
COMMUNITY
Start: 2023-04-03

## 2023-08-09 ENCOUNTER — LAB ENCOUNTER (OUTPATIENT)
Dept: LAB | Facility: HOSPITAL | Age: 63
End: 2023-08-09
Attending: INTERNAL MEDICINE
Payer: COMMERCIAL

## 2023-08-09 DIAGNOSIS — R00.2 PALPITATIONS: Primary | ICD-10-CM

## 2023-08-09 LAB
ATRIAL RATE: 91 BPM
P AXIS: 41 DEGREES
P-R INTERVAL: 136 MS
Q-T INTERVAL: 360 MS
QRS DURATION: 84 MS
QTC CALCULATION (BEZET): 442 MS
R AXIS: 49 DEGREES
T AXIS: 32 DEGREES
VENTRICULAR RATE: 91 BPM

## 2023-08-09 PROCEDURE — 93005 ELECTROCARDIOGRAM TRACING: CPT

## 2023-08-09 PROCEDURE — 93010 ELECTROCARDIOGRAM REPORT: CPT | Performed by: INTERNAL MEDICINE

## 2023-09-26 ENCOUNTER — LAB ENCOUNTER (OUTPATIENT)
Dept: LAB | Facility: HOSPITAL | Age: 63
End: 2023-09-26
Attending: INTERNAL MEDICINE
Payer: COMMERCIAL

## 2023-09-26 DIAGNOSIS — E78.5 HYPERLIPIDEMIA, UNSPECIFIED HYPERLIPIDEMIA TYPE: Primary | ICD-10-CM

## 2023-09-26 LAB
ALT SERPL-CCNC: 47 U/L
CHOLEST SERPL-MCNC: 179 MG/DL (ref ?–200)
FASTING PATIENT LIPID ANSWER: YES
HDLC SERPL-MCNC: 42 MG/DL (ref 40–59)
LDLC SERPL CALC-MCNC: 104 MG/DL (ref ?–100)
NONHDLC SERPL-MCNC: 137 MG/DL (ref ?–130)
TRIGL SERPL-MCNC: 192 MG/DL (ref 30–149)
VLDLC SERPL CALC-MCNC: 32 MG/DL (ref 0–30)

## 2023-09-26 PROCEDURE — 36415 COLL VENOUS BLD VENIPUNCTURE: CPT

## 2023-09-26 PROCEDURE — 84460 ALANINE AMINO (ALT) (SGPT): CPT

## 2023-09-26 PROCEDURE — 80061 LIPID PANEL: CPT

## 2023-10-11 ENCOUNTER — OFFICE VISIT (OUTPATIENT)
Dept: SURGERY | Facility: CLINIC | Age: 63
End: 2023-10-11
Payer: COMMERCIAL

## 2023-10-11 VITALS
BODY MASS INDEX: 34.66 KG/M2 | HEART RATE: 84 BPM | DIASTOLIC BLOOD PRESSURE: 79 MMHG | RESPIRATION RATE: 18 BRPM | WEIGHT: 203 LBS | SYSTOLIC BLOOD PRESSURE: 136 MMHG | TEMPERATURE: 98 F | HEIGHT: 64 IN | OXYGEN SATURATION: 96 %

## 2023-10-11 DIAGNOSIS — Z90.13 ABSENCE OF BOTH BREASTS: ICD-10-CM

## 2023-10-11 DIAGNOSIS — D05.12 DUCTAL CARCINOMA IN SITU (DCIS) OF LEFT BREAST: Primary | ICD-10-CM

## 2023-10-11 PROCEDURE — 3008F BODY MASS INDEX DOCD: CPT | Performed by: SURGERY

## 2023-10-11 PROCEDURE — 3075F SYST BP GE 130 - 139MM HG: CPT | Performed by: SURGERY

## 2023-10-11 PROCEDURE — 99213 OFFICE O/P EST LOW 20 MIN: CPT | Performed by: SURGERY

## 2023-10-11 PROCEDURE — 3078F DIAST BP <80 MM HG: CPT | Performed by: SURGERY

## 2023-11-29 NOTE — TELEPHONE ENCOUNTER
----- Message from Jonathan Graham RN sent at 3/23/2022  8:52 AM CDT -----  Patient was due for follow up in February- please call and see if she would like to schedule with another provider.- Breast    Wilton Garcia No

## 2024-01-12 ENCOUNTER — APPOINTMENT (OUTPATIENT)
Dept: HEMATOLOGY/ONCOLOGY | Facility: HOSPITAL | Age: 64
End: 2024-01-12
Attending: INTERNAL MEDICINE
Payer: COMMERCIAL

## 2024-03-27 ENCOUNTER — LAB ENCOUNTER (OUTPATIENT)
Dept: LAB | Facility: HOSPITAL | Age: 64
End: 2024-03-27
Attending: INTERNAL MEDICINE
Payer: COMMERCIAL

## 2024-03-27 DIAGNOSIS — Z00.00 ROUTINE GENERAL MEDICAL EXAMINATION AT A HEALTH CARE FACILITY: Primary | ICD-10-CM

## 2024-03-27 LAB
ALBUMIN SERPL-MCNC: 4.6 G/DL (ref 3.2–4.8)
ALBUMIN/GLOB SERPL: 1.8 {RATIO} (ref 1–2)
ALP LIVER SERPL-CCNC: 66 U/L
ALT SERPL-CCNC: 61 U/L
ANION GAP SERPL CALC-SCNC: 9 MMOL/L (ref 0–18)
AST SERPL-CCNC: 44 U/L (ref ?–34)
BASOPHILS # BLD AUTO: 0.04 X10(3) UL (ref 0–0.2)
BASOPHILS NFR BLD AUTO: 0.6 %
BILIRUB SERPL-MCNC: 0.6 MG/DL (ref 0.2–1.1)
BILIRUB UR QL: NEGATIVE
BUN BLD-MCNC: 14 MG/DL (ref 9–23)
BUN/CREAT SERPL: 17.1 (ref 10–20)
CALCIUM BLD-MCNC: 10.1 MG/DL (ref 8.7–10.4)
CHLORIDE SERPL-SCNC: 106 MMOL/L (ref 98–112)
CHOLEST SERPL-MCNC: 174 MG/DL (ref ?–200)
CLARITY UR: CLEAR
CO2 SERPL-SCNC: 28 MMOL/L (ref 21–32)
CREAT BLD-MCNC: 0.82 MG/DL
DEPRECATED RDW RBC AUTO: 40.4 FL (ref 35.1–46.3)
EGFRCR SERPLBLD CKD-EPI 2021: 80 ML/MIN/1.73M2 (ref 60–?)
EOSINOPHIL # BLD AUTO: 0.1 X10(3) UL (ref 0–0.7)
EOSINOPHIL NFR BLD AUTO: 1.6 %
ERYTHROCYTE [DISTWIDTH] IN BLOOD BY AUTOMATED COUNT: 12.3 % (ref 11–15)
EST. AVERAGE GLUCOSE BLD GHB EST-MCNC: 143 MG/DL (ref 68–126)
FASTING PATIENT LIPID ANSWER: YES
FASTING STATUS PATIENT QL REPORTED: YES
GLOBULIN PLAS-MCNC: 2.6 G/DL (ref 2.8–4.4)
GLUCOSE BLD-MCNC: 114 MG/DL (ref 70–99)
GLUCOSE UR-MCNC: NORMAL MG/DL
HBA1C MFR BLD: 6.6 % (ref ?–5.7)
HCT VFR BLD AUTO: 38.5 %
HDLC SERPL-MCNC: 38 MG/DL (ref 40–59)
HGB BLD-MCNC: 13.3 G/DL
HGB UR QL STRIP.AUTO: NEGATIVE
IMM GRANULOCYTES # BLD AUTO: 0.01 X10(3) UL (ref 0–1)
IMM GRANULOCYTES NFR BLD: 0.2 %
KETONES UR-MCNC: NEGATIVE MG/DL
LDLC SERPL CALC-MCNC: 106 MG/DL (ref ?–100)
LEUKOCYTE ESTERASE UR QL STRIP.AUTO: NEGATIVE
LYMPHOCYTES # BLD AUTO: 1.71 X10(3) UL (ref 1–4)
LYMPHOCYTES NFR BLD AUTO: 27.8 %
MCH RBC QN AUTO: 31.4 PG (ref 26–34)
MCHC RBC AUTO-ENTMCNC: 34.5 G/DL (ref 31–37)
MCV RBC AUTO: 90.8 FL
MONOCYTES # BLD AUTO: 0.59 X10(3) UL (ref 0.1–1)
MONOCYTES NFR BLD AUTO: 9.6 %
NEUTROPHILS # BLD AUTO: 3.71 X10 (3) UL (ref 1.5–7.7)
NEUTROPHILS # BLD AUTO: 3.71 X10(3) UL (ref 1.5–7.7)
NEUTROPHILS NFR BLD AUTO: 60.2 %
NITRITE UR QL STRIP.AUTO: NEGATIVE
NONHDLC SERPL-MCNC: 136 MG/DL (ref ?–130)
OSMOLALITY SERPL CALC.SUM OF ELEC: 297 MOSM/KG (ref 275–295)
PH UR: 5 [PH] (ref 5–8)
PLATELET # BLD AUTO: 303 10(3)UL (ref 150–450)
POTASSIUM SERPL-SCNC: 4.5 MMOL/L (ref 3.5–5.1)
PROT SERPL-MCNC: 7.2 G/DL (ref 5.7–8.2)
PROT UR-MCNC: NEGATIVE MG/DL
RBC # BLD AUTO: 4.24 X10(6)UL
SODIUM SERPL-SCNC: 143 MMOL/L (ref 136–145)
SP GR UR STRIP: 1.02 (ref 1–1.03)
TRIGL SERPL-MCNC: 171 MG/DL (ref 30–149)
TSI SER-ACNC: 1.36 MIU/ML (ref 0.55–4.78)
UROBILINOGEN UR STRIP-ACNC: NORMAL
VLDLC SERPL CALC-MCNC: 29 MG/DL (ref 0–30)
WBC # BLD AUTO: 6.2 X10(3) UL (ref 4–11)

## 2024-03-27 PROCEDURE — 85025 COMPLETE CBC W/AUTO DIFF WBC: CPT

## 2024-03-27 PROCEDURE — 84443 ASSAY THYROID STIM HORMONE: CPT

## 2024-03-27 PROCEDURE — 83036 HEMOGLOBIN GLYCOSYLATED A1C: CPT

## 2024-03-27 PROCEDURE — 80061 LIPID PANEL: CPT

## 2024-03-27 PROCEDURE — 81003 URINALYSIS AUTO W/O SCOPE: CPT

## 2024-03-27 PROCEDURE — 36415 COLL VENOUS BLD VENIPUNCTURE: CPT

## 2024-03-27 PROCEDURE — 80053 COMPREHEN METABOLIC PANEL: CPT

## 2024-04-29 ENCOUNTER — OFFICE VISIT (OUTPATIENT)
Dept: SURGERY | Facility: CLINIC | Age: 64
End: 2024-04-29
Payer: COMMERCIAL

## 2024-04-29 VITALS
BODY MASS INDEX: 33.8 KG/M2 | TEMPERATURE: 98 F | OXYGEN SATURATION: 98 % | HEART RATE: 96 BPM | RESPIRATION RATE: 17 BRPM | WEIGHT: 198 LBS | SYSTOLIC BLOOD PRESSURE: 105 MMHG | DIASTOLIC BLOOD PRESSURE: 72 MMHG | HEIGHT: 64 IN

## 2024-04-29 DIAGNOSIS — D05.12 DUCTAL CARCINOMA IN SITU (DCIS) OF LEFT BREAST: Primary | ICD-10-CM

## 2024-04-29 PROCEDURE — 99214 OFFICE O/P EST MOD 30 MIN: CPT

## 2024-04-29 RX ORDER — ATORVASTATIN CALCIUM 80 MG/1
80 TABLET, FILM COATED ORAL NIGHTLY
COMMUNITY

## 2024-04-30 NOTE — PROGRESS NOTES
Breast Surgery Surveillance Visit    Diagnosis: DCIS, left breast, ADH, right breast, s/p bilateral simple mastectomies, left SLNB on 6/28/2022    Stage: Tis N0 MX    Disease Status:  Surgical treatment complete, no further intervention pending.    History of Present Illness:   Ms. Nora Claudio is a 63 year old woman who presents with imaging detected left breast DCIS.  The patient denies any palpable masses, nipple discharge, skin changes or axillary symptoms.  She does have a family history of breast cancer.  She is a personal history of a right breast lumpectomy for DCIS back in 2004 at which time she also underwent radiation therapy and 5 years of tamoxifen therapy.  Presented on surveillance since that time.  Bilateral screening mammogram in May 2022 demonstrated a linear area of calcifications seen in her left breast which additional imaging was recommended.  Left diagnostic evaluation on the 18th 2022 demonstrated 2 areas of asymmetry associated with calcifications for which a 2 site left breast biopsy was recommended.  This took place on June 1, 2022 and confirmed at the upper inner quadrant multifocal ductal carcinoma in situ ER positive. She underwent bilateral mastectomies, which occurred without complication.  Denies any new clinical concerns since her last visit.  She is here today for evaluation and recommendations for further therapy.        Past Medical History:    Abdominal discomfort, generalized    Anemia    Anesthesia complication    Patient does not want Versed caused severe dizziness - states she has PTSD from it    Anxiety state, unspecified    Carcinoma in situ of breast    age 43    Ductal carcinoma in situ of right breast    CLICK MAGNIFYING GLASS (ABOVE) OR F3 TO EXPAND 01/21/2004 MAMMOGRAM FIRST SCREENING  -  suspicious microcalcifications upper inner quadrant of her right breast 01/30/2004 MAMMOTOME BIOPSY STEROTACTIC  -  ductal carcinoma in situ, high grade, without necrosis in  one of four core biopsies, ER 92%  DE 73%  Her 2 bridget negative  Ki-67 2% 2012: 2004 RIGHT LUMPECTOMY WITH NEEDLE LOCALLIZATION     Endometrium, polyp    Essential hypertension    Exposure to medical diagnostic radiation    right breast    Gastroesophageal reflux disease with esophagitis    GERD (gastroesophageal reflux disease)    High blood pressure    Hx of motion sickness    Hypertension    Migraine, unspecified, without mention of intractable migraine without mention of status migrainosus    Migraines    Mitral valve disorders(424.0)    N&V (nausea and vomiting)    with temp    Post-menopausal bleeding    Prediabetes    Seasonal allergies    Unspecified adverse effect of unspecified drug, medicinal and biological substance    Visual impairment    glasses       Past Surgical History:   Procedure Laterality Date    Hysteroscopy,with sampling  2009    Lumpectomy right  2004    Garret biopsy stereo nodule 2 site bilat (cpt=19081/50310)      Needle biopsy left      possibly in     Radiation right  2004       Gynecological History:  Pt is a   She achieved menarche at age 10 and LMP 2011  Age of Menopause: 50  Type: natural menopause  She denies any history of hormone replacement therapy  She denies any history of oral contraceptive use   She denies infertility treatment to achieve pregnancy.    Medications:     atorvastatin 80 MG Oral Tab Take 1 tablet (80 mg total) by mouth nightly.      metFORMIN 500 MG Oral Tab Take 1 tablet (500 mg total) by mouth 2 (two) times daily with meals. With breakfast and dinner      hydroCHLOROthiazide 12.5 MG Oral Tab 1 tablet (12.5 mg total).      Multiple Vitamins-Minerals (MULTIVITAMIN ADULT OR) Take by mouth.      Vitamin D3 2000 units Oral Cap Take 1 capsule (2,000 Units total) by mouth daily.      Lactobacillus (PROBIOTIC ACIDOPHILUS OR) Take by mouth as needed.      Citalopram Hydrobromide (CELEXA) 20 MG Oral Tab Take 1 tablet (20 mg total) by mouth daily.  4     lisinopril (PRINIVIL,ZESTRIL) 40 MG Oral Tab Take 1 tablet (40 mg total) by mouth daily.  4    Esomeprazole Magnesium 40 MG Oral Capsule Delayed Release Take  by mouth. take 1 capsule (40MG)  by oral route  every day      AmLODIPine Besylate (NORVASC) 5 MG Oral Tab Take  by mouth. take 1 tablet (5MG)  by oral route  every day         Allergies:    Allergies   Allergen Reactions    Epinephrine OTHER (SEE COMMENTS)     Received lidocaine-epinephrine which caused Increased eye pressure in     Benzodiazepines ANXIETY, HALLUCINATION and RESTLESSNESS    Monosodium Glutamate DIARRHEA    Other FEVER     TB Time Test; caused fever     Augmentin [Amoxicillin-Pot Clavulanate] DIARRHEA       Family History:   Family History   Problem Relation Age of Onset    Breast Cancer Maternal Grandmother 84         from heart failure    Heart Disease Maternal Grandmother         CHF (cause of death)    Breast Cancer Mother 46        cause of death    Cancer Father         multiple myeloma    Breast Cancer Self 43        DCIS, right; BRCA1/2 neg ()    Diabetes Paternal Grandfather     Heart Disorder Paternal Grandfather        She is not of Ashkenazi Anabaptist ancestry.    Social History:  History   Alcohol Use No         History   Smoking Status    Never   Smokeless Tobacco    Never       Ms. Nora Claudio is Single with 0 children. She has 1 siblings. She is currently Retired    Review of Systems:  General:   The patient denies, fever, chills, night sweats, fatigue, generalized weakness, change in appetite or weight loss.    HEENT:     The patient denies eye irritation, cataracts, redness, glaucoma, yellowing of the eyes, change in vision, color blindness, or +wearing contacts/glasses. The patient denies hearing loss, ringing in the ears, ear drainage, earaches, nasal congestion, nose bleeds, snoring, pain in mouth/throat, hoarseness, change in voice, facial trauma.    Respiratory:  The patient denies chronic cough,  phlegm, hemoptysis, pleurisy/chest pain, pneumonia, asthma, wheezing, difficulty in breathing with exertion, emphysema, chronic bronchitis, shortness of breath or abnormal sound when breathing.     Cardiovascular:  There is no history of chest pain, chest pressure/discomfort, +palpitations, irregular heartbeat, fainting or near-fainting, difficulty breathing when lying flat, SOB/Coughing at night, swelling of the legs or chest pain while walking.    Breasts:  See history of present illness    Gastrointestinal:     There is no history of difficulty or pain with swallowing, +reflux symptoms, vomiting, dark or bloody stools, constipation, yellowing of the skin, indigestion, nausea, change in bowel habits, +diarrhea, abdominal pain or vomiting blood.     Genitourinary:  The patient denies +frequent urination, needing to get up at night to urinate, urinary hesitancy or retaining urine, painful urination, urinary incontinence, decreased urine stream, blood in the urine or vaginal/penile discharge.    Skin:    The patient denies rash, itching, skin lesions, dry skin, change in skin color or change in moles.     Hematologic/Lymphatic:  The patient denies easily bruising or bleeding or persistent swollen glands or lymph nodes.     Musculoskeletal:  The patient denies +muscle aches/pain, joint pain, +stiff joints, neck pain, back pain or bone pain.    Neuropsychiatric:  There is no history of +migraines or severe headaches, seizure/epilepsy, speech problems, coordination problems, trembling/tremors, fainting/black outs, +dizziness, memory problems, loss of sensation/numbness, problems walking, weakness, tingling or burning in hands/feet. There is no history of abusive relationship, bipolar disorder, sleep disturbance, +anxiety, depression or feeling of despair.    Endocrine:    There is no history of poor/slow wound healing, weight loss/gain, fertility or hormone problems, cold intolerance, thyroid disease.      Allergic/Immunologic:  There is no history of hives, hay fever, angioedema or anaphylaxis.    /72 (BP Location: Right arm, Patient Position: Sitting, Cuff Size: large)   Pulse 96   Temp 98 °F (36.7 °C)   Resp 17   Ht 1.626 m (5' 4\")   Wt 89.8 kg (198 lb)   SpO2 98%   BMI 33.99 kg/m²     Physical Exam:  The patient is an alert, oriented, well-nourished and  well-developed woman who appears her stated age. Her speech patterns and movements are normal. Her affect is appropriate.    HEENT: The head is normocephalic. The neck is supple. The thyroid is not enlarged and is without palpable masses/nodules. There are no palpable masses. The trachea is in the midline. Conjunctiva are clear, non-icteric.    Chest: The chest expands symmetrically. The lungs are clear to auscultation.    Heart: The rhythm is regular.  There are no murmurs, rubs, gallops or thrills.    Breasts:  Bilateral breasts are surgically removed.  No palpable chest wall masses, skin changes and or axillary adenopathy appreciated bilaterally.    Lymph Nodes:  The supraclavicular, axillary and cervical regions are free of significant lymphadenopathy.    Back: There is no vertebral column tenderness.    Skin: The skin appears normal. There are no suspicious appearing rashes or lesions.    Extremities: The extremities are without deformity, cyanosis or edema.    Impression:   Ms. Nora Claudio is a 63 year old woman presents with personal history right breast DCIS with family history of breast cancer new diagnosis of left breast DCIS s/p bilateral mastectomy.    Recommendations:   I had a discussion with the Patient regarding her breast exam.  She is healing well since surgery with no signs of recurrent disease.  She has had some left shoulder arthralgias that do not appear to be related to surgical healing.  I recommend no acute intervention. She should follow up in 6 months for her next surveillance exam.  I personally reviewed the  pathology that confirmed successful removal of the remaining DCIS of the left breast and a focal ADH of the right.  No further treatment will be needed for this.  She will need no further mammograms status post bilateral mastectomies. I encouraged her to continue monitoring her ROM and strength and explained that a referral to physical therapy may be warranted in the future if she identifies any limitations or restrictions. She was given ample opportunity for questions and those questions were answered to her satisfaction. She was encouraged to contact the office with any questions or concerns prior to her next scheduled appointment.

## 2024-05-06 ENCOUNTER — ORDER TRANSCRIPTION (OUTPATIENT)
Dept: ADMINISTRATIVE | Facility: HOSPITAL | Age: 64
End: 2024-05-06

## 2024-05-06 DIAGNOSIS — E11.9 DIABETES (HCC): Primary | ICD-10-CM

## 2024-05-20 ENCOUNTER — HOSPITAL ENCOUNTER (OUTPATIENT)
Dept: ENDOCRINOLOGY | Facility: HOSPITAL | Age: 64
End: 2024-05-20
Attending: INTERNAL MEDICINE

## 2024-05-20 VITALS — WEIGHT: 199.19 LBS | BODY MASS INDEX: 34.01 KG/M2 | HEIGHT: 64 IN

## 2024-05-20 DIAGNOSIS — E11.65 TYPE 2 DIABETES MELLITUS WITH HYPERGLYCEMIA, UNSPECIFIED WHETHER LONG TERM INSULIN USE (HCC): Primary | ICD-10-CM

## 2024-05-20 DIAGNOSIS — E11.9 TYPE 2 DIABETES MELLITUS (HCC): Primary | ICD-10-CM

## 2024-05-20 NOTE — PROGRESS NOTES
Nora Claudio : 1960  attended individual initial assessment for Diabetes Education:    Date: 2024         Start time: 1300 End time: 1415    HgbA1C (%)   Date Value   2024 6.6 (H)       Wt Readings from Last 1 Encounters:   24 198 lb       Assessment: Nora was recently diagnosed with Diabetes. She has a strong family history of diabetes.  She has been monitoring her fasting blood sugar for the past 9 years related to pre-diabetes diagnosis.  She has been monitoring her blood sugar between 4-7 times per day to understanding blood sugar values.    Diet Hx:   3 meals per day. She is counting calories and carbohydrates.  She has been utilizing \"net\" carbohydrates and is interested in \"resistant carbohydrates\"      Education provided on the below topics:     Diabetes Overview:  Pathophysiology, A1C results and treatment options for diabetes self-management reviewed.   Described basic process and treatment options for diabetes self-management.  Introduced long term impact of uncontrolled blood glucose on health.    Healthy Eating:  Obtained usual diet history.  Instructed on Basic Diet Guidelines which includes eating 3 meals/day. Eat moderate amounts at consistent times from day to day. Limit/avoid sweets. Instructed on Balanced Plate Method of meal planning. Instructed to limit grains or starchy vegetables to ¼ of plate, protein to ¼ of plate, non-starchy vegetables to ½ plate and modest portions of fruit, yogurt, milk as desired.    Being Active:   Encouraged Physical Activity and briefly reviewed ADA recommended guidelines for activity with type 2 diabetes.    Taking Medications:   Reviewed current diabetes medications (if applicable).    Monitoring:  Instructed/reinforced blood glucose monitoring, testing schedules and target goals:   Fasting / Pre-meal  mg/dL 2 Hour Post-prandial <180 mg/dL  Demonstrated ability to perform blood glucose testing.     Problem Solving:    Prevention, detection and treatment of acute complications: taught symptoms of hypoglycemia, hyperglycemia, how to treat low blood sugar (Rule of 15) and actions for lowering high blood glucose levels.     Behavior Change:  Initiated individualized goal setting process and will continue to refine throughout class series.    Recommendations:      Monitor blood glucose as directed.  Follow Balance Plate method of meal planning.   Attend all Group Class in series -starting in Wednesday evenings in June  Monitor blood sugar in the morning and 2 hours after meals    Written materials provided for all areas covered.  Patient verbalized understanding and all questions answered.    Mary Garza RN

## 2024-05-29 ENCOUNTER — OFFICE VISIT (OUTPATIENT)
Facility: CLINIC | Age: 64
End: 2024-05-29

## 2024-05-29 ENCOUNTER — LAB ENCOUNTER (OUTPATIENT)
Dept: LAB | Facility: HOSPITAL | Age: 64
End: 2024-05-29
Attending: INTERNAL MEDICINE
Payer: MEDICARE

## 2024-05-29 VITALS
SYSTOLIC BLOOD PRESSURE: 131 MMHG | DIASTOLIC BLOOD PRESSURE: 81 MMHG | HEIGHT: 64 IN | WEIGHT: 196 LBS | HEART RATE: 75 BPM | BODY MASS INDEX: 33.46 KG/M2

## 2024-05-29 DIAGNOSIS — R19.7 DIARRHEA, UNSPECIFIED TYPE: Primary | ICD-10-CM

## 2024-05-29 DIAGNOSIS — R19.7 DIARRHEA, UNSPECIFIED TYPE: ICD-10-CM

## 2024-05-29 DIAGNOSIS — K58.0 IRRITABLE BOWEL SYNDROME WITH DIARRHEA: ICD-10-CM

## 2024-05-29 LAB — IGA SERPL-MCNC: 125.7 MG/DL (ref 40–350)

## 2024-05-29 PROCEDURE — 36415 COLL VENOUS BLD VENIPUNCTURE: CPT

## 2024-05-29 PROCEDURE — 82784 ASSAY IGA/IGD/IGG/IGM EACH: CPT

## 2024-05-29 PROCEDURE — 86364 TISS TRNSGLTMNASE EA IG CLAS: CPT

## 2024-05-29 PROCEDURE — 99213 OFFICE O/P EST LOW 20 MIN: CPT | Performed by: INTERNAL MEDICINE

## 2024-05-29 RX ORDER — LOPERAMIDE HYDROCHLORIDE 2 MG/1
CAPSULE ORAL
Qty: 180 CAPSULE | Refills: 2 | Status: SHIPPED | OUTPATIENT
Start: 2024-05-29

## 2024-05-29 NOTE — PATIENT INSTRUCTIONS
1.  Blood work for celiac disease.  2.  May use Imodium (loperamide) on a daily basis to control symptoms.  3.  If you do not choose to undergo a colonoscopy performed either yearly FIT testing or a Cologuard test every 3 years.  4.  Please contact me with any changes.

## 2024-05-29 NOTE — PROGRESS NOTES
Subjective:   Patient ID: Nora Claudio is a 64 year old female.    HPI  The patient returns in follow-up.  She was last seen in January 2023.     Previous history:  As per previous notes Nora has had 3 episodes of uncomplicated diverticulitis.  The first episode was in August 2019 when she presented with malaise, fever and left lower quadrant abdominal pain.  She was evaluated in the ED.  A CT scan revealed \"mild focal acute diverticulitis involving the mid sigmoid colon located posterior to the uterus.  No large abscess, phlegmon, free fluid or free air collection\".  The patient was treated with a 10-day course of amoxicillin/clavulanate with symptom resolution.     In January 2022 the patient developed \"another spell\".  She was clinically diagnosed with diverticulitis and treated with cephalexin with symptom resolution.     In September 2022 the patient presented with \"horrible diarrhea\".  She attributes this to eating cookie dough that contained a raw egg.  She had a CT scan performed which revealed uncomplicated distal descending/proximal sigmoid colon diverticulitis.  There was also mention of hepatic steatosis, a cyst in the spleen, uterine fibroids and mild maryann mesentery.  Continued symptoms prompted an ED evaluation and a second CT scan which revealed mild acute diverticulitis in the distal descending colon, rectal wall thickening with submucosal hyperemia and a similar finding in the cecum/ascending colon suggesting colitis.  Cholelithiasis was present.  There was a 2.2 cm cystic lesion in the spleen.  The patient was treated with levofloxacin and metronidazole.  She also took concurrent Florastor.  She had intermittent diarrhea for several weeks that finally abated by the end of November (she has a yeast allergy and questions whether the Florastor could be causative).     The patient has a personal history of breast cancer in 2004 and 2022.  She underwent radiation therapy and lumpectomy in  2014 and subsequent bilateral mastectomies.    At the time of the patient's last visit she described being \"easily prone to diarrhea\" dating back to her teenage years with episodic postprandial fecal urgency and diarrhea 20-30 minutes after eating.  If she evacuates she is \"one and done\" and may not have another episode for months.  Avoiding gluten or lactose has not prevented the episodes.  MSG and yeast extract were described as precipitants.    We had discussed a colonoscopy in light of the patient's previous episodes of diverticulitis and for colorectal cancer screening.  She was fearful of sedation for the procedure (adverse reactions to both midazolam and diazepam) and did not proceed with testing.  She has had yearly FIT testing which was negative.    Current history:  The patient returns today endorsing \"2 types of diarrhea\".  The first she believes is due to IBS and described as postprandial with abdominal cramping and within 20 minutes having a bowel movement which will resolve the episode \"after getting rid of it\" and \"one and out\".  She will take an occasional dose of loperamide (4 mg) which will help resolve an episode or will prevent an episode (described as \"pre-modium\").  These episodes happen 2-3 times weekly.    The second type of diarrhea she believes is due to metformin.  She describes 1-3 episodes of watery diarrhea without abdominal cramping that may resolve after a few hours.  A lower dose of metformin was better tolerated.  She has not tried an extended release formulation.    The patient has otherwise been well.  She has voluntarily lost weight since being diagnosed with diabetes.  She has had no recurrent episodes of diverticulitis.  She will note occasional mucus in the stool but no bleeding.    She does not use artificially sweetened products, or sugarless gum/candy.    She does not take NSAIDs.    She is never constipated.     History/Other:   Review of Systems  See above    Wt Readings  from Last 7 Encounters:   05/29/24 196 lb (88.9 kg)   05/20/24 199 lb 3.2 oz (90.4 kg)   04/29/24 198 lb (89.8 kg)   10/11/23 203 lb (92.1 kg)   07/10/23 204 lb 9.6 oz (92.8 kg)   03/08/23 198 lb (89.8 kg)   01/16/23 203 lb 11.2 oz (92.4 kg)       Current Outpatient Medications   Medication Sig Dispense Refill    loperamide 2 MG Oral Cap Take 2 capsules daily as needed for diarrhea. 180 capsule 2    atorvastatin 80 MG Oral Tab Take 1 tablet (80 mg total) by mouth nightly.      metFORMIN 500 MG Oral Tab Take 1 tablet (500 mg total) by mouth 2 (two) times daily with meals. With breakfast and dinner      hydroCHLOROthiazide 12.5 MG Oral Tab 1 tablet (12.5 mg total).      Multiple Vitamins-Minerals (MULTIVITAMIN ADULT OR) Take by mouth.      Vitamin D3 2000 units Oral Cap Take 1 capsule (2,000 Units total) by mouth daily.      Lactobacillus (PROBIOTIC ACIDOPHILUS OR) Take by mouth as needed.      Citalopram Hydrobromide (CELEXA) 20 MG Oral Tab Take 1 tablet (20 mg total) by mouth daily.  4    lisinopril (PRINIVIL,ZESTRIL) 40 MG Oral Tab Take 1 tablet (40 mg total) by mouth daily.  4    Esomeprazole Magnesium 40 MG Oral Capsule Delayed Release Take  by mouth. take 1 capsule (40MG)  by oral route  every day      AmLODIPine Besylate (NORVASC) 5 MG Oral Tab Take  by mouth. take 1 tablet (5MG)  by oral route  every day       Allergies:  Allergies   Allergen Reactions    Epinephrine OTHER (SEE COMMENTS)     Received lidocaine-epinephrine which caused Increased eye pressure in 1980    Benzodiazepines ANXIETY, HALLUCINATION and RESTLESSNESS    Monosodium Glutamate DIARRHEA    Other FEVER     TB Time Test; caused fever     Augmentin [Amoxicillin-Pot Clavulanate] DIARRHEA       Objective:   Physical Exam  Vitals and nursing note reviewed.   Constitutional:       General: She is not in acute distress.     Appearance: She is well-developed. She is not ill-appearing or diaphoretic.   HENT:      Head: Normocephalic and atraumatic.       Mouth/Throat:      Pharynx: No oropharyngeal exudate.   Eyes:      General: No scleral icterus.     Conjunctiva/sclera: Conjunctivae normal.   Neck:      Thyroid: No thyromegaly.   Cardiovascular:      Rate and Rhythm: Normal rate and regular rhythm.      Heart sounds: Normal heart sounds.   Pulmonary:      Effort: Pulmonary effort is normal. No respiratory distress.      Breath sounds: Normal breath sounds. No wheezing or rales.   Abdominal:      General: Bowel sounds are normal. There is no distension.      Palpations: Abdomen is soft. There is no mass.      Tenderness: There is no abdominal tenderness. There is no guarding or rebound.   Musculoskeletal:      Cervical back: Neck supple.   Lymphadenopathy:      Cervical: No cervical adenopathy.   Neurological:      Mental Status: She is alert and oriented to person, place, and time.   Psychiatric:         Behavior: Behavior normal.         Component      [facility]est [facility] 3/27/2024   WBC      4.0 - 11.0 x10(3) uL 6.2    RBC      3.80 - 5.30 x10(6)uL 4.24    Hemoglobin      12.0 - 16.0 g/dL 13.3    Hematocrit      35.0 - 48.0 % 38.5    MCV      80.0 - 100.0 fL 90.8    MCH      26.0 - 34.0 pg 31.4    MCHC      31.0 - 37.0 g/dL 34.5    RDW-SD      35.1 - 46.3 fL 40.4    RDW      11.0 - 15.0 % 12.3    Platelet Count      150.0 - 450.0 10(3)uL 303.0    Prelim Neutrophil Abs      1.50 - 7.70 x10 (3) uL 3.71    Neutrophils Absolute      1.50 - 7.70 x10(3) uL 3.71    Lymphocytes Absolute      1.00 - 4.00 x10(3) uL 1.71    Monocytes Absolute      0.10 - 1.00 x10(3) uL 0.59    Eosinophils Absolute      0.00 - 0.70 x10(3) uL 0.10    Basophils Absolute      0.00 - 0.20 x10(3) uL 0.04    Immature Granulocyte Absolute      0.00 - 1.00 x10(3) uL 0.01    Neutrophils %      % 60.2    Lymphocytes %      % 27.8    Monocytes %      % 9.6    Eosinophils %      % 1.6    Basophils %      % 0.6    Immature Granulocyte %      % 0.2    Glucose      70 - 99 mg/dL 114 (H)    Sodium      136 -  145 mmol/L 143    Potassium      3.5 - 5.1 mmol/L 4.5    Chloride      98 - 112 mmol/L 106    Carbon Dioxide, Total      21.0 - 32.0 mmol/L 28.0    ANION GAP      0 - 18 mmol/L 9    BUN      9 - 23 mg/dL 14    CREATININE      0.55 - 1.02 mg/dL 0.82    BUN/CREATININE RATIO      10.0 - 20.0  17.1    CALCIUM      8.7 - 10.4 mg/dL 10.1    CALCULATED OSMOLALITY      275 - 295 mOsm/kg 297 (H)    EGFR      >=60 mL/min/1.73m2 80    ALT (SGPT)      10 - 49 U/L 61 (H)    AST (SGOT)      <=34 U/L 44 (H)    ALKALINE PHOSPHATASE      50 - 130 U/L 66    Total Bilirubin      0.2 - 1.1 mg/dL 0.6    PROTEIN, TOTAL      5.7 - 8.2 g/dL 7.2    Albumin      3.2 - 4.8 g/dL 4.6    Globulin      2.8 - 4.4 g/dL 2.6 (L)    A/G Ratio      1.0 - 2.0  1.8    Patient Fasting for CMP? Yes    HEMOGLOBIN A1c      <5.7 % 6.6 (H)    ESTIMATED AVERAGE GLUCOSE      68 - 126 mg/dL 143 (H)    TSH      0.550 - 4.780 mIU/mL 1.358       Legend:  (H) High  (L) Low      Assessment & Plan:   1. Diarrhea, unspecified type    2. Irritable bowel syndrome with diarrhea    The patient presents with the above mentioned symptoms.  The IBS or postprandial episodes of diarrhea are likely due to IBS-D.  We discussed the possibility of celiac disease.  Metformin could have induced diarrhea as well.  We discussed other possibilities including a microscopic colitis.  I doubt macroscopic inflammatory bowel disease.  I am recommending sprue serology which was ordered.  The patient may use loperamide as often as needed to control symptoms.  A prescription was written.  We again discussed a colonoscopy which would assist in the diagnosis of microscopic colitis, however, steroid treatment would not be advised if loperamide controls the symptoms.  Colonoscopy in this setting would be performed for colorectal cancer screening.  If the patient does not choose to undergo a colonoscopy, she should continue either yearly FIT testing or Cologuard testing every 3 years.  We discussed  that a positive result would prompt a colonoscopy.  Further recommendations pending results of a sprue serology and clinical course.        Orders Placed This Encounter   Procedures    Tissue Transglutaminase Ab, IgA    Immunoglobulin A, Qn, Serum (IGA)       Meds This Visit:  Requested Prescriptions     Signed Prescriptions Disp Refills    loperamide 2 MG Oral Cap 180 capsule 2     Sig: Take 2 capsules daily as needed for diarrhea.       Imaging & Referrals:  None

## 2024-05-30 LAB — TTG IGA SER-ACNC: <0.2 U/ML (ref ?–7)

## 2024-06-05 ENCOUNTER — HOSPITAL ENCOUNTER (OUTPATIENT)
Dept: ENDOCRINOLOGY | Facility: HOSPITAL | Age: 64
Discharge: HOME | End: 2024-06-05
Attending: INTERNAL MEDICINE
Payer: MEDICARE

## 2024-06-05 VITALS — WEIGHT: 195.88 LBS | BODY MASS INDEX: 34 KG/M2

## 2024-06-05 DIAGNOSIS — E11.65 TYPE 2 DIABETES MELLITUS WITH HYPERGLYCEMIA, UNSPECIFIED WHETHER LONG TERM INSULIN USE (HCC): Primary | ICD-10-CM

## 2024-06-06 NOTE — PROGRESS NOTES
Nora Claudio  DOB5/27/1960 attended Step 2 Group Diabetes Education Class:    Date: 6/5/2024  Start time: 6:00 pm  End time: 8:00 pm     Wt:   Wt Readings from Last 1 Encounters:   06/05/24 195 lb 14.4 oz     Weight change since start of program: -3.3 lbs    Diabetes Overview:  Pathophysiology, pre-diabetes, A1C results, treatment options for diabetes self-management, types of diabetes, myths and facts, risk factors, benefits of good blood glucose control and psychosocial aspects reviewed.     Assessment: Moitoring FBG  and PPBG   Reports Walking 30 minutes daily and gardening 2+ hrs daily    Patient demonstrates self-monitoring and dietary tracking.    Healthy Eating:  Reviewed Balanced Plate Method and discussed examples of Balanced Plate Method meal planning.   Reviewed macronutrients (carbohydrate, protein, fat) and their impact on blood glucose levels.  Introduced benefits of lower fat food choices.    Being Active:  Discussed exercise benefits and precautions including its effect on blood glucose levels.    Monitoring:  Benefits and options for glucose monitoring, target BG goals, HgbA1C values.   Emphasized importance and benefit of dietary tracking.    Problem Solving: Prevention, detection and treatment of acute complications:  Discussed signs, symptoms and treatment of hypoglycemia and hyperglycemia.    Medication:  Instructed on classes of Diabetes medications available and additional cardiovascular and renal benefits.    Behavior Change:  Goals set for nutrition and monitoring of blood glucose. Discussed how habits are formed, identifying cues and triggers. Discussed identifying barriers to action. Discussed how dietary tracking benefits weight loss.   Reviewed and updated individual goals and action plan set by patient.     Recommendations:  Implement healthy eating habits with portion control and following Balanced Plate Method.  Monitor blood glucose as directed.  Attend remaining  sessions.  Continue to implement individual goals.    Written materials provided for all areas covered.    Patient verbalized understanding and has no further questions at this time.    Alexa Whitley RN

## 2024-06-12 ENCOUNTER — HOSPITAL ENCOUNTER (OUTPATIENT)
Dept: ENDOCRINOLOGY | Facility: HOSPITAL | Age: 64
Discharge: HOME | End: 2024-06-12
Attending: INTERNAL MEDICINE
Payer: MEDICARE

## 2024-06-12 DIAGNOSIS — E11.9 TYPE 2 DIABETES MELLITUS WITHOUT COMPLICATION, WITHOUT LONG-TERM CURRENT USE OF INSULIN (HCC): Primary | ICD-10-CM

## 2024-06-13 NOTE — PROGRESS NOTES
Nora Claudio  DOB5/27/1960 attended Step 3 Group Diabetes Education Class:    Date: 6/12/2024  Start time: 6P End time: 8P    Wt:   Wt Readings from Last 1 Encounters:   06/05/24 195 lb 14.4 oz     Weight change since start of program: -3.3#    Blood Glucose: Controlled: Stable     Healthy Eating:  Reviewed Basic Diet Guidelines as foundation of diabetes meal planning. Reinforced the balanced plate method. Taught nutrition basics defining carbohydrate, protein, and fat. Taught label reading, including an option to count carbohydrate grams or servings. Provided patient with goals for carbohydrate grams/choices for meals and snacks. Discussed sugar substitutes, ETOH and effect on blood glucose. Gamaliel's helpful for smart phones, as well as websites for examining carbohydrate levels provided. Reviewed and reinforced macronutrient's, carbohydrate counting and meal planning.    Problem Solving:  Reinforced signs, symptoms, and treatment of hypoglycemia using the Rule of 15.  Discussed impact of different food items and portion sizes on blood glucose levels.  Discussed blood glucose target of 180 mg/dL, if testing 2 hours post meal.    Monitoring:  Reviewed blood glucose records and importance of tracking foods/blood glucose levels.  Reinforced the importance of taking medications as prescribed.     Behavior Change:  Reviewed and updated individual goals and action plan set by patient.   Addressed barriers to tracking foods/blood glucose levels and following guidelines presented/achieving goals, as a group discussion.    Recommendations:  Follow individual meal plan recommended by Educator (MIGUELITO).  Continue implementing individual goals.   Attend remaining sessions.  Begin implementing carbohydrate counting and continue dietary and blood glucose tracking.    Written materials provided for all areas covered.    Patient verbalized understanding and has no further questions currently     Yue Dey RD

## 2024-06-19 ENCOUNTER — HOSPITAL ENCOUNTER (OUTPATIENT)
Dept: ENDOCRINOLOGY | Facility: HOSPITAL | Age: 64
Discharge: HOME | End: 2024-06-19
Attending: INTERNAL MEDICINE

## 2024-06-19 VITALS — WEIGHT: 192.5 LBS | BODY MASS INDEX: 33 KG/M2

## 2024-06-19 DIAGNOSIS — E11.65 TYPE 2 DIABETES MELLITUS WITH HYPERGLYCEMIA, UNSPECIFIED WHETHER LONG TERM INSULIN USE (HCC): Primary | ICD-10-CM

## 2024-06-20 NOTE — PROGRESS NOTES
Nora Claudio  DOB5/27/1960 attended Step 4 Group Diabetes Education Class:     Date: 6/19/2024  Start time: 6:00 pm End time: 7:50 pm    Wt:   Wt Readings from Last 1 Encounters:   06/19/24 192 lb 8 oz     Weight change since start of program:  -6.7 lbs    Blood Glucose: Controlled: Stable    Reviewed information covered in previous classes including benefits of maintaining good blood glucose control and healthy weight in decreasing diabetes complications.     Prevention, detection, and treatment of chronic complications  Reviewed how to reduce risks of complications including eye, foot, dental, renal, and cardiovascular. Discussed American Diabetes Association guidelines for testing including eye exam, lipid panels, urine protein tests, dental and foot exams. Encouraged to get annual flu shot. Discussed importance of immunizations, vaccinations, and guidelines for sick day management. Discussed wearing medical ID.     Problem Solving  Discussed signs, symptoms, and prevention of DKA and HHNS.  Discussed disaster preparedness and Diabetes.   Discussed Diabetes medication assistance and supply management.     Healthy Eating  Reviewed and reinforced macronutrients, carbohydrate counting, and meal planning.  Reviewed meal planning methods.   Discussed healthy eating approaches for dining out, holidays and special occasions.  Provided tips on how family members can support healthy changes in diet.      Behavior Change  Reviewed and updated individual goals and action plan set by patient.     Recommendations:  Monitor blood glucose as directed.  Follow individual meal plan and continue dietary tracking.  Attend remaining sessions.  Continue to implement individual goals.      Written materials provided for all areas covered.    Patient verbalized understanding and has no further questions at this time.     Mary Garza RN

## 2024-06-26 ENCOUNTER — HOSPITAL ENCOUNTER (OUTPATIENT)
Dept: ENDOCRINOLOGY | Facility: HOSPITAL | Age: 64
Discharge: HOME | End: 2024-06-26
Attending: INTERNAL MEDICINE

## 2024-06-26 DIAGNOSIS — E11.9 NEWLY DIAGNOSED DIABETES (HCC): Primary | ICD-10-CM

## 2024-06-27 NOTE — PROGRESS NOTES
Nora Claudio  DOB5/27/1960 attended Step 5 Group Diabetes Education Class:    Date: 6/26/2024  Start time: 6P End time: 8    Wt:   Wt Readings from Last 1 Encounters:   06/19/24 192 lb 8 oz     Weight change since start of program: 7#        Blood Glucose: Controlled: Stable    Healthy Eating  Reviewed and reinforced macronutrients, carbohydrate counting, and meal planning. Reviewed meal planning methods. Taught principles of heart healthy eating (fats, cholesterol, fiber, and sodium intake).  Discussed Mediterranean meal planning.  Taught label guidelines for choosing fat controlled proteins, snacks and desserts. Discussed how to integrate information for meal planning.     Medication   Guidelines for cholesterol medications for persons with Diabetes.    Healthy Coping  Instructed on developing and implementing a support plan. Discussed avoiding Diabetes burnout, dealing with stress and stress reduction techniques. Discussed recognizing and dealing with depression and diabetes.     Being Active  Reinforced the benefits of exercise and safety precautions. Discussed the effects of blood glucose levels and role in weight loss. Discussed strategies for increasing activity and maintaining regular exercise regimen.    Problem Solving  List of community resources provided and discussed.    Behavior Change  Reviewed and updated individual goals set by patient. Action plan completed and Diabetes support plan initiated    Patient has identified the following goal(s) and actions related to: Healthy Eating and Being Active  Diabetes goal assessment and action plan scanned into Media  Nora Claudio has chosen the following ongoing Diabetes Support Plan: The use of Diabetes Websites or Apps    Written materials provided for all areas covered.    Patient verbalized understanding and has no further questions at this time.     Yue Dey RD

## 2024-07-05 ENCOUNTER — OFFICE VISIT (OUTPATIENT)
Dept: PHYSICAL MEDICINE AND REHAB | Facility: CLINIC | Age: 64
End: 2024-07-05
Payer: MEDICARE

## 2024-07-05 VITALS — BODY MASS INDEX: 32.78 KG/M2 | RESPIRATION RATE: 18 BRPM | HEIGHT: 64 IN | WEIGHT: 192 LBS

## 2024-07-05 DIAGNOSIS — Z85.3 PERSONAL HISTORY OF MALIGNANT NEOPLASM OF BREAST: ICD-10-CM

## 2024-07-05 DIAGNOSIS — F41.9 ANXIETY AND DEPRESSION: ICD-10-CM

## 2024-07-05 DIAGNOSIS — K21.00 GASTROESOPHAGEAL REFLUX DISEASE WITH ESOPHAGITIS, UNSPECIFIED WHETHER HEMORRHAGE: ICD-10-CM

## 2024-07-05 DIAGNOSIS — M76.31 ILIOTIBIAL BAND SYNDROME OF BOTH SIDES: ICD-10-CM

## 2024-07-05 DIAGNOSIS — M76.892 ENTHESOPATHY OF HIP REGION ON BOTH SIDES: Primary | ICD-10-CM

## 2024-07-05 DIAGNOSIS — M76.32 ILIOTIBIAL BAND SYNDROME OF BOTH SIDES: ICD-10-CM

## 2024-07-05 DIAGNOSIS — F32.A ANXIETY AND DEPRESSION: ICD-10-CM

## 2024-07-05 DIAGNOSIS — M76.891 ENTHESOPATHY OF HIP REGION ON BOTH SIDES: Primary | ICD-10-CM

## 2024-07-05 PROCEDURE — 99204 OFFICE O/P NEW MOD 45 MIN: CPT | Performed by: PHYSICAL MEDICINE & REHABILITATION

## 2024-07-05 RX ORDER — CYCLOBENZAPRINE HCL 10 MG
10 TABLET ORAL NIGHTLY
Qty: 30 TABLET | Refills: 0 | Status: SHIPPED | OUTPATIENT
Start: 2024-07-05 | End: 2024-08-04

## 2024-07-05 NOTE — PROGRESS NOTES
[facility]  Progress Note    CHIEF COMPLAINT:    Chief Complaint   Patient presents with    New Patient     New R handed patient Rfd by pcp. Today, reports pain to buttock. Symptoms off/on for years but recently becoming more bothersome. + radiation down to upper portion of posterior thigh. Denies N/T. No PT, medication or imaging. CPL 3/10 but can reach +6/10 with overuse and certain positions.        History of Present Illness:  Nora Claudio is a 64 year old female who is being seen in consultation at the request of Dr. Matias.  She has a chief complaint of back and bilateral sacral pain.  This is been going on for years intermittently, no injury.  Is becoming more bothersome recently, radiation is into the buttocks and thighs but staying above the knee.  No numbness or tingling.  No workup or treatment.  She works in the yard unimpeded but feels tired afterwards.    PAST MEDICAL HISTORY:  Past Medical History:    Abdominal discomfort, generalized    Anemia    Anesthesia complication    Patient does not want Versed caused severe dizziness - states she has PTSD from it    Anxiety state, unspecified    Carcinoma in situ of breast    age 43    Ductal carcinoma in situ of right breast    CLICK MAGNIFYING GLASS (ABOVE) OR F3 TO EXPAND 01/21/2004 MAMMOGRAM FIRST SCREENING  -  suspicious microcalcifications upper inner quadrant of her right breast 01/30/2004 MAMMOTOME BIOPSY STEROTACTIC  -  ductal carcinoma in situ, high grade, without necrosis in one of four core biopsies, ER 92%  CO 73%  Her 2 bridget negative  Ki-67 2% 03/30/2012: 02/11/2004 RIGHT LUMPECTOMY WITH NEEDLE LOCALLIZATION     Endometrium, polyp    Essential hypertension    Exposure to medical diagnostic radiation    right breast    Gastroesophageal reflux disease with esophagitis    GERD (gastroesophageal reflux disease)    High blood pressure    Hx of motion sickness    Hypertension    Migraine,  unspecified, without mention of intractable migraine without mention of status migrainosus    Migraines    Mitral valve disorders(424.0)    N&V (nausea and vomiting)    with temp    Post-menopausal bleeding    Prediabetes    Seasonal allergies    Unspecified adverse effect of unspecified drug, medicinal and biological substance    Visual impairment    glasses       SURGICAL HISTORY:  Past Surgical History:   Procedure Laterality Date    Hysteroscopy,with sampling  2009    Lumpectomy right  2004    Garret biopsy stereo nodule 2 site bilat (cpt=19081/04900)      Needle biopsy left      possibly in 2008    Radiation right  2004       SOCIAL HISTORY:   Social History     Occupational History    Not on file   Tobacco Use    Smoking status: Never    Smokeless tobacco: Never   Vaping Use    Vaping status: Never Used   Substance and Sexual Activity    Alcohol use: No    Drug use: No    Sexual activity: Not on file       CURRENT MEDICATIONS:   Current Outpatient Medications   Medication Sig Dispense Refill    cyclobenzaprine 10 MG Oral Tab Take 1 tablet (10 mg total) by mouth nightly. 30 tablet 0    loperamide 2 MG Oral Cap Take 2 capsules daily as needed for diarrhea. 180 capsule 2    atorvastatin 80 MG Oral Tab Take 1 tablet (80 mg total) by mouth nightly.      metFORMIN 500 MG Oral Tab Take 1 tablet (500 mg total) by mouth 2 (two) times daily with meals. With breakfast and dinner      hydroCHLOROthiazide 12.5 MG Oral Tab 1 tablet (12.5 mg total).      Multiple Vitamins-Minerals (MULTIVITAMIN ADULT OR) Take by mouth.      Vitamin D3 2000 units Oral Cap Take 1 capsule (2,000 Units total) by mouth daily.      Lactobacillus (PROBIOTIC ACIDOPHILUS OR) Take by mouth as needed.      Citalopram Hydrobromide (CELEXA) 20 MG Oral Tab Take 1 tablet (20 mg total) by mouth daily.  4    lisinopril (PRINIVIL,ZESTRIL) 40 MG Oral Tab Take 1 tablet (40 mg total) by mouth daily.  4    Esomeprazole Magnesium 40 MG Oral Capsule Delayed Release  Take  by mouth. take 1 capsule (40MG)  by oral route  every day      AmLODIPine Besylate (NORVASC) 5 MG Oral Tab Take  by mouth. take 1 tablet (5MG)  by oral route  every day         ALLERGIES:   Allergies   Allergen Reactions    Epinephrine OTHER (SEE COMMENTS)     Received lidocaine-epinephrine which caused Increased eye pressure in 1980    Benzodiazepines ANXIETY, HALLUCINATION and RESTLESSNESS    Monosodium Glutamate DIARRHEA    Other FEVER     TB Time Test; caused fever     Augmentin [Amoxicillin-Pot Clavulanate] DIARRHEA             PHYSICAL EXAM:   Resp 18   Ht 64\"   Wt 192 lb (87.1 kg)   BMI 32.96 kg/m²     Body mass index is 32.96 kg/m².      General: No immediate distress  Head: Normocephalic/ Atraumatic  Extremities: No lower extremity edema bilaterally. Peripheral pulses intact.   Spine:  full and painfree lumbar ROM in all directions  Hips: full and painfree ROM, tender over the iliotibial band and particularly the iliac crest bilaterally, no midline tenderness  Neuro:   Cognition: alert & oriented x 3, attentive, able to follow 2 step commands, comprehention intact, spontaneous speech intact  Strength: Lower extremities have 5/5 strength  Sensation: Normal lower extremities  Reflexes: Normal lower extremities  SLR: neg    Data    Radiology Imaging:  A CT of the abdomen pelvis from 2022 report revealed scattered degenerative endplate changes.  Films are unable to be reviewed due to archiving.      ASSESSMENT AND PLAN:  1. Enthesopathy of hip region on both sides  This is impacting sleep so I think Flexeril is reasonable.  I also think physical therapy would help.  Return in 4-6 weeks.  - PHYSICAL THERAPY EXTERNAL    2. Iliotibial band syndrome of both sides  As above  - PHYSICAL THERAPY EXTERNAL    3. Gastroesophageal reflux disease with esophagitis, unspecified whether hemorrhage  Avoiding NSAIDs    4. Personal history of malignant neoplasm of breast  In remission        The patient was in agreement  with the assessment and plan.  All questions were answered.        Tom Dsouza MD  Physical Medicine and Rehabilitation/Sports Medicine  [facility]

## 2024-07-15 ENCOUNTER — MED REC SCAN ONLY (OUTPATIENT)
Dept: PHYSICAL MEDICINE AND REHAB | Facility: CLINIC | Age: 64
End: 2024-07-15

## 2024-08-12 ENCOUNTER — OFFICE VISIT (OUTPATIENT)
Dept: PHYSICAL MEDICINE AND REHAB | Facility: CLINIC | Age: 64
End: 2024-08-12
Payer: MEDICARE

## 2024-08-12 DIAGNOSIS — M76.891 ENTHESOPATHY OF HIP REGION ON BOTH SIDES: Primary | ICD-10-CM

## 2024-08-12 DIAGNOSIS — F41.9 ANXIETY AND DEPRESSION: ICD-10-CM

## 2024-08-12 DIAGNOSIS — M76.892 ENTHESOPATHY OF HIP REGION ON BOTH SIDES: Primary | ICD-10-CM

## 2024-08-12 DIAGNOSIS — K21.00 GASTROESOPHAGEAL REFLUX DISEASE WITH ESOPHAGITIS, UNSPECIFIED WHETHER HEMORRHAGE: ICD-10-CM

## 2024-08-12 DIAGNOSIS — F32.A ANXIETY AND DEPRESSION: ICD-10-CM

## 2024-08-12 DIAGNOSIS — M76.31 ILIOTIBIAL BAND SYNDROME OF BOTH SIDES: ICD-10-CM

## 2024-08-12 DIAGNOSIS — Z85.3 PERSONAL HISTORY OF MALIGNANT NEOPLASM OF BREAST: ICD-10-CM

## 2024-08-12 DIAGNOSIS — M76.32 ILIOTIBIAL BAND SYNDROME OF BOTH SIDES: ICD-10-CM

## 2024-08-12 PROCEDURE — 99214 OFFICE O/P EST MOD 30 MIN: CPT | Performed by: PHYSICAL MEDICINE & REHABILITATION

## 2024-08-12 RX ORDER — DULOXETIN HYDROCHLORIDE 30 MG/1
30 CAPSULE, DELAYED RELEASE ORAL DAILY
Qty: 30 CAPSULE | Refills: 0 | Status: SHIPPED | OUTPATIENT
Start: 2024-08-12

## 2024-08-12 NOTE — PROGRESS NOTES
[facility]  Progress Note    CHIEF COMPLAINT:    Chief Complaint   Patient presents with    Hip Pain     LOV: 7/05/2024 for B/L hip pain R > L and R hamstring pain. Patient in PT at this time w/o much improvement. Uses salonpas patches & a car seat cushion w/ some help. Current pain 4-5/10 intermittent - denies N/T, denies weakness.  Reports going up stairs aggravates pain. Pt reports feels better after PT but only for short duration.        History of Present Illness:  Nora Claudio is a 64 year old female who presents today for follow up for symptoms of soft tissue pain around the pelvic girdle.  She is doing physical therapy which is not really helping.  She does notice Salonpas patches and car seat cushions help.  She has had chronic tendinitis in the past which took a long time and she feels this is similar.  No leg radiation, PT helps but the next day she feels very sore.    PAST MEDICAL HISTORY:  Past Medical History:    Abdominal discomfort, generalized    Anemia    Anesthesia complication    Patient does not want Versed caused severe dizziness - states she has PTSD from it    Anxiety state, unspecified    Carcinoma in situ of breast    age 43    Ductal carcinoma in situ of right breast    CLICK MAGNIFYING GLASS (ABOVE) OR F3 TO EXPAND 01/21/2004 MAMMOGRAM FIRST SCREENING  -  suspicious microcalcifications upper inner quadrant of her right breast 01/30/2004 MAMMOTOME BIOPSY STEROTACTIC  -  ductal carcinoma in situ, high grade, without necrosis in one of four core biopsies, ER 92%  MT 73%  Her 2 bridget negative  Ki-67 2% 03/30/2012: 02/11/2004 RIGHT LUMPECTOMY WITH NEEDLE LOCALLIZATION     Endometrium, polyp    Essential hypertension    Exposure to medical diagnostic radiation    right breast    Gastroesophageal reflux disease with esophagitis    GERD (gastroesophageal reflux disease)    High blood pressure    Hx of motion sickness    Hypertension     Migraine, unspecified, without mention of intractable migraine without mention of status migrainosus    Migraines    Mitral valve disorders(424.0)    N&V (nausea and vomiting)    with temp    Post-menopausal bleeding    Prediabetes    Seasonal allergies    Unspecified adverse effect of unspecified drug, medicinal and biological substance    Visual impairment    glasses       SURGICAL HISTORY:  Past Surgical History:   Procedure Laterality Date    Hysteroscopy,with sampling  2009    Lumpectomy right  2004    Garret biopsy stereo nodule 2 site bilat (cpt=19081/01071)      Needle biopsy left      possibly in 2008    Radiation right  2004       SOCIAL HISTORY:   Social History     Occupational History    Not on file   Tobacco Use    Smoking status: Never    Smokeless tobacco: Never   Vaping Use    Vaping status: Never Used   Substance and Sexual Activity    Alcohol use: No    Drug use: No    Sexual activity: Not on file       CURRENT MEDICATIONS:   Current Outpatient Medications   Medication Sig Dispense Refill    DULoxetine (CYMBALTA) 30 MG Oral Cap DR Particles Take 1 capsule (30 mg total) by mouth daily. 30 capsule 0    loperamide 2 MG Oral Cap Take 2 capsules daily as needed for diarrhea. 180 capsule 2    atorvastatin 80 MG Oral Tab Take 1 tablet (80 mg total) by mouth nightly.      metFORMIN 500 MG Oral Tab Take 1 tablet (500 mg total) by mouth 2 (two) times daily with meals. With breakfast and dinner      hydroCHLOROthiazide 12.5 MG Oral Tab 1 tablet (12.5 mg total).      Multiple Vitamins-Minerals (MULTIVITAMIN ADULT OR) Take by mouth.      Vitamin D3 2000 units Oral Cap Take 1 capsule (2,000 Units total) by mouth daily.      Lactobacillus (PROBIOTIC ACIDOPHILUS OR) Take by mouth as needed.      lisinopril (PRINIVIL,ZESTRIL) 40 MG Oral Tab Take 1 tablet (40 mg total) by mouth daily.  4    Esomeprazole Magnesium 40 MG Oral Capsule Delayed Release Take  by mouth. take 1 capsule (40MG)  by oral route  every day       AmLODIPine Besylate (NORVASC) 5 MG Oral Tab Take  by mouth. take 1 tablet (5MG)  by oral route  every day         ALLERGIES:   Allergies   Allergen Reactions    Epinephrine OTHER (SEE COMMENTS)     Received lidocaine-epinephrine which caused Increased eye pressure in 1980    Benzodiazepines ANXIETY, HALLUCINATION and RESTLESSNESS    Monosodium Glutamate DIARRHEA    Other FEVER     TB Time Test; caused fever     Augmentin [Amoxicillin-Pot Clavulanate] DIARRHEA           PHYSICAL EXAM:   There were no vitals taken for this visit.    There is no height or weight on file to calculate BMI.      General: No immediate distress  Extremities: No lower extremity edema bilaterally   Spine: full and painfree lumbar ROM in all directions  Hips: full and painfree ROM, no impingement signs.  Very tender over the lateral and posterior soft tissues also tender along the iliac crest.  Nontender over the trochanteric bursa.  Neuro:   Cognition: alert & oriented x 3, attentive, able to follow 2 step commands, comprehention intact, spontaneous speech intact  Strength: Lower extremities have 5/5 strength  Sensation: Normal lower extremities  Reflexes: Normal lower extremities  SLR: neg        Data    Radiology Imaging:  I personally reviewed a CT of the abdomen pelvis from 2022.  The spinal components show mild L4-5 central canal stenosis due to ligamentous hypertrophy.  Hip joints demonstrate decreased superior joint space bilaterally      ASSESSMENT AND PLAN:  1. Enthesopathy of hip region on both sides  This is primarily soft tissue pain.  I recommend switching out Celexa for Cymbalta which helps with soft tissue pain.  Return in 6 weeks.    2. Iliotibial band syndrome of both sides  As above    3. Gastroesophageal reflux disease with esophagitis, unspecified whether hemorrhage  No NSAIDs    4. Personal history of malignant neoplasm of breast  In remission    5. Anxiety and depression  Cymbalta should be appropriate        RTC 6  weeks      The patient was in agreement with the assessment and plan.  All questions were answered.        Tom Dsouza MD  Physical Medicine and Rehabilitation/Sports Medicine  [facility]

## 2024-08-13 ENCOUNTER — MED REC SCAN ONLY (OUTPATIENT)
Dept: PHYSICAL MEDICINE AND REHAB | Facility: CLINIC | Age: 64
End: 2024-08-13

## 2024-09-28 ENCOUNTER — LAB ENCOUNTER (OUTPATIENT)
Dept: LAB | Facility: HOSPITAL | Age: 64
End: 2024-09-28
Attending: INTERNAL MEDICINE
Payer: MEDICARE

## 2024-09-28 DIAGNOSIS — R73.01 IMPAIRED FASTING GLUCOSE: ICD-10-CM

## 2024-09-28 DIAGNOSIS — E78.00 PURE HYPERCHOLESTEROLEMIA: Primary | ICD-10-CM

## 2024-09-28 LAB
ALT SERPL-CCNC: 33 U/L
ANION GAP SERPL CALC-SCNC: 5 MMOL/L (ref 0–18)
BUN BLD-MCNC: 12 MG/DL (ref 9–23)
BUN/CREAT SERPL: 15.2 (ref 10–20)
CALCIUM BLD-MCNC: 10.3 MG/DL (ref 8.7–10.4)
CHLORIDE SERPL-SCNC: 107 MMOL/L (ref 98–112)
CHOLEST SERPL-MCNC: 128 MG/DL (ref ?–200)
CO2 SERPL-SCNC: 30 MMOL/L (ref 21–32)
CREAT BLD-MCNC: 0.79 MG/DL
EGFRCR SERPLBLD CKD-EPI 2021: 83 ML/MIN/1.73M2 (ref 60–?)
EST. AVERAGE GLUCOSE BLD GHB EST-MCNC: 123 MG/DL (ref 68–126)
FASTING PATIENT LIPID ANSWER: YES
FASTING STATUS PATIENT QL REPORTED: YES
GLUCOSE BLD-MCNC: 107 MG/DL (ref 70–99)
HBA1C MFR BLD: 5.9 % (ref ?–5.7)
HDLC SERPL-MCNC: 36 MG/DL (ref 40–59)
LDLC SERPL CALC-MCNC: 72 MG/DL (ref ?–100)
NONHDLC SERPL-MCNC: 92 MG/DL (ref ?–130)
OSMOLALITY SERPL CALC.SUM OF ELEC: 294 MOSM/KG (ref 275–295)
POTASSIUM SERPL-SCNC: 4.8 MMOL/L (ref 3.5–5.1)
SODIUM SERPL-SCNC: 142 MMOL/L (ref 136–145)
TRIGL SERPL-MCNC: 110 MG/DL (ref 30–149)
VLDLC SERPL CALC-MCNC: 17 MG/DL (ref 0–30)

## 2024-09-28 PROCEDURE — 36415 COLL VENOUS BLD VENIPUNCTURE: CPT

## 2024-09-28 PROCEDURE — 80061 LIPID PANEL: CPT

## 2024-09-28 PROCEDURE — 80048 BASIC METABOLIC PNL TOTAL CA: CPT

## 2024-09-28 PROCEDURE — 83036 HEMOGLOBIN GLYCOSYLATED A1C: CPT

## 2024-09-28 PROCEDURE — 84460 ALANINE AMINO (ALT) (SGPT): CPT

## 2024-10-01 ENCOUNTER — OFFICE VISIT (OUTPATIENT)
Dept: PHYSICAL MEDICINE AND REHAB | Facility: CLINIC | Age: 64
End: 2024-10-01
Payer: MEDICARE

## 2024-10-01 VITALS — WEIGHT: 177 LBS | BODY MASS INDEX: 30.22 KG/M2 | HEIGHT: 64 IN

## 2024-10-01 DIAGNOSIS — M76.32 ILIOTIBIAL BAND SYNDROME OF BOTH SIDES: ICD-10-CM

## 2024-10-01 DIAGNOSIS — M76.31 ILIOTIBIAL BAND SYNDROME OF BOTH SIDES: ICD-10-CM

## 2024-10-01 DIAGNOSIS — K21.00 GASTROESOPHAGEAL REFLUX DISEASE WITH ESOPHAGITIS, UNSPECIFIED WHETHER HEMORRHAGE: ICD-10-CM

## 2024-10-01 DIAGNOSIS — M76.891 ENTHESOPATHY OF HIP REGION ON BOTH SIDES: Primary | ICD-10-CM

## 2024-10-01 DIAGNOSIS — M76.892 ENTHESOPATHY OF HIP REGION ON BOTH SIDES: Primary | ICD-10-CM

## 2024-10-01 PROCEDURE — 99213 OFFICE O/P EST LOW 20 MIN: CPT | Performed by: PHYSICAL MEDICINE & REHABILITATION

## 2024-10-01 NOTE — PROGRESS NOTES
[facility]  Progress Note    CHIEF COMPLAINT:    Chief Complaint   Patient presents with    Follow - Up     LOV 8/12/2024 Patient follows up on BL hip pain, states she finished PT which was helpful. Did not switch to celebrex as she wanted to consult with her PCP first. States she was painting her condo and is just sore. LOP 2/10       History of Present Illness:  Nora Claudio is a 64 year old female who presents today for follow up for symptoms of bilateral lateral thigh and hip pain.  She completed physical therapy which has helped.  She is recently remodeled her vacation home in [place] and was able to do that without undue discomfort.  We discussed switching to Cymbalta versus Celexa.  She has not done that yet, waiting to discuss with her primary physician.    PAST MEDICAL HISTORY:  Past Medical History:    Abdominal discomfort, generalized    Anemia    Anesthesia complication    Patient does not want Versed caused severe dizziness - states she has PTSD from it    Anxiety state, unspecified    Carcinoma in situ of breast    age 43    Diabetes (HCC)    Ductal carcinoma in situ of right breast    CLICK MAGNIFYING GLASS (ABOVE) OR F3 TO EXPAND 01/21/2004 MAMMOGRAM FIRST SCREENING  -  suspicious microcalcifications upper inner quadrant of her right breast 01/30/2004 MAMMOTOME BIOPSY STEROTACTIC  -  ductal carcinoma in situ, high grade, without necrosis in one of four core biopsies, ER 92%  NH 73%  Her 2 bridget negative  Ki-67 2% 03/30/2012: 02/11/2004 RIGHT LUMPECTOMY WITH NEEDLE LOCALLIZATION     Endometrium, polyp    Essential hypertension    Exposure to medical diagnostic radiation    right breast    Gastroesophageal reflux disease with esophagitis    GERD (gastroesophageal reflux disease)    High blood pressure    Hx of motion sickness    Hyperlipidemia    Hypertension    Migraine, unspecified, without mention of intractable migraine without mention of  status migrainosus    Migraines    Mitral valve disorders(424.0)    N&V (nausea and vomiting)    with temp    Post-menopausal bleeding    Prediabetes    Seasonal allergies    Unspecified adverse effect of unspecified drug, medicinal and biological substance    Visual impairment    glasses       SURGICAL HISTORY:  Past Surgical History:   Procedure Laterality Date    Hysteroscopy,with sampling  2009    Lumpectomy right  2004    Garret biopsy stereo nodule 2 site bilat (cpt=19081/02302)      Needle biopsy left      possibly in 2008    Radiation right  2004       SOCIAL HISTORY:   Social History     Occupational History    Not on file   Tobacco Use    Smoking status: Never    Smokeless tobacco: Never    Tobacco comments:     None, ever.   Vaping Use    Vaping status: Never Used   Substance and Sexual Activity    Alcohol use: Never    Drug use: Never    Sexual activity: Not on file       CURRENT MEDICATIONS:   Current Outpatient Medications   Medication Sig Dispense Refill    DULoxetine (CYMBALTA) 30 MG Oral Cap DR Particles Take 1 capsule (30 mg total) by mouth daily. 30 capsule 0    loperamide 2 MG Oral Cap Take 2 capsules daily as needed for diarrhea. 180 capsule 2    atorvastatin 80 MG Oral Tab Take 1 tablet (80 mg total) by mouth nightly.      metFORMIN 500 MG Oral Tab Take 1 tablet (500 mg total) by mouth 2 (two) times daily with meals. With breakfast and dinner      hydroCHLOROthiazide 12.5 MG Oral Tab 1 tablet (12.5 mg total).      Multiple Vitamins-Minerals (MULTIVITAMIN ADULT OR) Take by mouth.      Vitamin D3 2000 units Oral Cap Take 1 capsule (2,000 Units total) by mouth daily.      Lactobacillus (PROBIOTIC ACIDOPHILUS OR) Take by mouth as needed.      lisinopril (PRINIVIL,ZESTRIL) 40 MG Oral Tab Take 1 tablet (40 mg total) by mouth daily.  4    Esomeprazole Magnesium 40 MG Oral Capsule Delayed Release Take  by mouth. take 1 capsule (40MG)  by oral route  every day      AmLODIPine Besylate (NORVASC) 5 MG Oral  Tab Take  by mouth. take 1 tablet (5MG)  by oral route  every day         ALLERGIES:   Allergies   Allergen Reactions    Epinephrine OTHER (SEE COMMENTS)     Received lidocaine-epinephrine which caused Increased eye pressure in 1980    Benzodiazepines ANXIETY, HALLUCINATION and RESTLESSNESS    Monosodium Glutamate DIARRHEA    Other FEVER     TB Time Test; caused fever     Augmentin [Amoxicillin-Pot Clavulanate] DIARRHEA           PHYSICAL EXAM:   Ht 64\"   Wt 177 lb (80.3 kg)   BMI 30.38 kg/m²     Body mass index is 30.38 kg/m².      Unchanged        Data    Radiology Imaging:  I reviewed a CT of the abdomen pelvis from 2022.  The spinal components show mild L4-5 central canal stenosis due to ligamentous hypertrophy.  Hip joints demonstrate decreased superior joint space bilaterally       ASSESSMENT AND PLAN:  1. Enthesopathy of hip region on both sides  Improved with physical therapy.  I recommend she continue with her home exercises.  She will discuss Cymbalta with her primary care physician.  That might help with the soft tissue components.    2. Iliotibial band syndrome of both sides  As above    3. Gastroesophageal reflux disease with esophagitis, unspecified whether hemorrhage  No NSAIDs, limits treatment options        RTC as needed      The patient was in agreement with the assessment and plan.  All questions were answered.        Tom Dsouza MD  Physical Medicine and Rehabilitation/Sports Medicine  [facility]

## 2024-11-27 ENCOUNTER — OFFICE VISIT (OUTPATIENT)
Age: 64
End: 2024-11-27
Payer: MEDICARE

## 2024-11-27 VITALS
RESPIRATION RATE: 16 BRPM | WEIGHT: 180.38 LBS | TEMPERATURE: 98 F | DIASTOLIC BLOOD PRESSURE: 81 MMHG | SYSTOLIC BLOOD PRESSURE: 136 MMHG | BODY MASS INDEX: 31 KG/M2 | OXYGEN SATURATION: 95 % | HEART RATE: 93 BPM

## 2024-11-27 DIAGNOSIS — D05.12 DUCTAL CARCINOMA IN SITU (DCIS) OF LEFT BREAST: Primary | ICD-10-CM

## 2024-11-27 DIAGNOSIS — Z90.13 ABSENCE OF BOTH BREASTS: ICD-10-CM

## 2024-11-27 PROCEDURE — 99213 OFFICE O/P EST LOW 20 MIN: CPT | Performed by: SURGERY

## 2024-11-27 RX ORDER — CITALOPRAM HYDROBROMIDE 20 MG/1
20 TABLET ORAL DAILY
COMMUNITY

## 2024-11-27 NOTE — PROGRESS NOTES
Breast Surgery Surveillance Visit    Diagnosis: DCIS, left breast, ADH, right breast, s/p bilateral simple mastectomies, left SLNB on 6/28/2022    Stage: Tis N0 MX    Disease Status:  Surgical treatment complete, no further intervention pending.    History of Present Illness:   Ms. Nora Claudio is a 64 year old woman who presents with imaging detected left breast DCIS.  The patient denies any palpable masses, nipple discharge, skin changes or axillary symptoms.  She does have a family history of breast cancer.  She is a personal history of a right breast lumpectomy for DCIS back in 2004 at which time she also underwent radiation therapy and 5 years of tamoxifen therapy.  Presented on surveillance since that time.  Bilateral screening mammogram in May 2022 demonstrated a linear area of calcifications seen in her left breast which additional imaging was recommended.  Left diagnostic evaluation on the 18th 2022 demonstrated 2 areas of asymmetry associated with calcifications for which a 2 site left breast biopsy was recommended.  This took place on June 1, 2022 and confirmed at the upper inner quadrant multifocal ductal carcinoma in situ ER positive. She underwent bilateral mastectomies, which occurred without complication.  Denies any new clinical concerns since her last visit.  She is here today for evaluation and recommendations for further therapy.        Past Medical History:    Abdominal discomfort, generalized    Anemia    Anesthesia complication    Patient does not want Versed caused severe dizziness - states she has PTSD from it    Anxiety state, unspecified    Carcinoma in situ of breast    age 43    Diabetes (HCC)    Ductal carcinoma in situ of right breast    CLICK MAGNIFYING GLASS (ABOVE) OR F3 TO EXPAND 01/21/2004 MAMMOGRAM FIRST SCREENING  -  suspicious microcalcifications upper inner quadrant of her right breast 01/30/2004 MAMMOTOME BIOPSY STEROTACTIC  -  ductal carcinoma in situ, high grade,  without necrosis in one of four core biopsies, ER 92%  [place] 73%  Her 2 bridget negative  Ki-67 2% 2012: 2004 RIGHT LUMPECTOMY WITH NEEDLE LOCALLIZATION     Endometrium, polyp    Essential hypertension    Exposure to medical diagnostic radiation    right breast    Gastroesophageal reflux disease with esophagitis    GERD (gastroesophageal reflux disease)    High blood pressure    Hx of motion sickness    Hyperlipidemia    Hypertension    Migraine, unspecified, without mention of intractable migraine without mention of status migrainosus    Migraines    Mitral valve disorders(424.0)    N&V (nausea and vomiting)    with temp    Post-menopausal bleeding    Prediabetes    Seasonal allergies    Unspecified adverse effect of unspecified drug, medicinal and biological substance    Visual impairment    glasses       Past Surgical History:   Procedure Laterality Date    Hysteroscopy,with sampling  2009    Lumpectomy right  2004    Garret biopsy stereo nodule 2 site bilat (cpt=19081/59182)      Needle biopsy left      possibly in     Radiation right  2004       Gynecological History:  Pt is a   She achieved menarche at age 10 and LMP 2011  Age of Menopause: 50  Type: natural menopause  She denies any history of hormone replacement therapy  She denies any history of oral contraceptive use   She denies infertility treatment to achieve pregnancy.    Medications:     citalopram 20 MG Oral Tab Take 1 tablet (20 mg total) by mouth daily.      loperamide 2 MG Oral Cap Take 2 capsules daily as needed for diarrhea. (Patient taking differently: as needed. Take 2 capsules daily as needed for diarrhea.) 180 capsule 2    atorvastatin 80 MG Oral Tab Take 1 tablet (80 mg total) by mouth nightly.      metFORMIN 500 MG Oral Tab Take 1 tablet (500 mg total) by mouth 2 (two) times daily with meals. With breakfast and dinner      hydroCHLOROthiazide 12.5 MG Oral Tab 1 tablet (12.5 mg total).      Multiple Vitamins-Minerals  (MULTIVITAMIN ADULT OR) Take by mouth.      Vitamin D3 2000 units Oral Cap Take 1 capsule (2,000 Units total) by mouth daily.      Lactobacillus (PROBIOTIC ACIDOPHILUS OR) Take by mouth as needed.      lisinopril (PRINIVIL,ZESTRIL) 40 MG Oral Tab Take 1 tablet (40 mg total) by mouth daily.  4    Esomeprazole Magnesium 40 MG Oral Capsule Delayed Release Take  by mouth. take 1 capsule (40MG)  by oral route  every day      AmLODIPine Besylate (NORVASC) 5 MG Oral Tab Take  by mouth. take 1 tablet (5MG)  by oral route  every day         Allergies:    Allergies   Allergen Reactions    Epinephrine OTHER (SEE COMMENTS)     Received lidocaine-epinephrine which caused Increased eye pressure in     Benzodiazepines ANXIETY, HALLUCINATION and RESTLESSNESS    Monosodium Glutamate DIARRHEA    Other FEVER     TB Time Test; caused fever     Augmentin [Amoxicillin-Pot Clavulanate] DIARRHEA       Family History:   Family History   Problem Relation Age of Onset    Breast Cancer Maternal Grandmother 84         from heart failure    Heart Disease Maternal Grandmother         CHF (cause of death)    Breast Cancer Mother 46        cause of death    Hypertension Mother     Lipids Mother     Migraines Mother     Cancer Father         multiple myeloma    Diabetes Father     Heart Disease Father     Hypertension Father     Breast Cancer Self 43        DCIS, right; BRCA1/2 neg ()    Heart Attack Maternal Grandfather     Diabetes Paternal Grandmother     Diabetes Paternal Grandfather     Heart Disorder Paternal Grandfather     Heart Attack Paternal Grandfather     Heart Disease Paternal Grandfather     Lipids Sister     Migraines Sister        She is not of Ashkenazi Confucianist ancestry.    Social History:  History   Alcohol Use Never         History   Smoking Status    Never   Smokeless Tobacco    Never       Ms. Nora Claudio is Single with 0 children. She has 1 siblings. She is currently Retired    Review of Systems:  General:    The patient denies, fever, chills, night sweats, fatigue, generalized weakness, change in appetite or weight loss.    HEENT:     The patient denies eye irritation, cataracts, redness, glaucoma, yellowing of the eyes, change in vision, color blindness, or +wearing contacts/glasses. The patient denies hearing loss, ringing in the ears, ear drainage, earaches, nasal congestion, nose bleeds, snoring, pain in mouth/throat, hoarseness, change in voice, facial trauma.    Respiratory:  The patient denies chronic cough, phlegm, hemoptysis, pleurisy/chest pain, pneumonia, asthma, wheezing, difficulty in breathing with exertion, emphysema, chronic bronchitis, shortness of breath or abnormal sound when breathing.     Cardiovascular:  There is no history of chest pain, chest pressure/discomfort, +palpitations, irregular heartbeat, fainting or near-fainting, difficulty breathing when lying flat, SOB/Coughing at night, swelling of the legs or chest pain while walking.    Breasts:  See history of present illness    Gastrointestinal:     There is no history of difficulty or pain with swallowing, +reflux symptoms, vomiting, dark or bloody stools, constipation, yellowing of the skin, indigestion, nausea, change in bowel habits, +diarrhea, abdominal pain or vomiting blood.     Genitourinary:  The patient denies +frequent urination, needing to get up at night to urinate, urinary hesitancy or retaining urine, painful urination, urinary incontinence, decreased urine stream, blood in the urine or vaginal/penile discharge.    Skin:    The patient denies rash, itching, skin lesions, dry skin, change in skin color or change in moles.     Hematologic/Lymphatic:  The patient denies easily bruising or bleeding or persistent swollen glands or lymph nodes.     Musculoskeletal:  The patient denies +muscle aches/pain, joint pain, +stiff joints, neck pain, back pain or bone pain.    Neuropsychiatric:  There is no history of +migraines or severe  headaches, seizure/epilepsy, speech problems, coordination problems, trembling/tremors, fainting/black outs, +dizziness, memory problems, loss of sensation/numbness, problems walking, weakness, tingling or burning in hands/feet. There is no history of abusive relationship, bipolar disorder, sleep disturbance, +anxiety, depression or feeling of despair.    Endocrine:    There is no history of poor/slow wound healing, weight loss/gain, fertility or hormone problems, cold intolerance, thyroid disease.     Allergic/Immunologic:  There is no history of hives, hay fever, angioedema or anaphylaxis.    /81 (BP Location: Left arm, Patient Position: Sitting, Cuff Size: adult)   Pulse 93   Temp 97.5 °F (36.4 °C) (Temporal)   Resp 16   Wt 81.8 kg (180 lb 6.4 oz)   SpO2 95%   BMI 30.97 kg/m²     Physical Exam:  The patient is an alert, oriented, well-nourished and  well-developed woman who appears her stated age. Her speech patterns and movements are normal. Her affect is appropriate.    HEENT: The head is normocephalic. The neck is supple. The thyroid is not enlarged and is without palpable masses/nodules. There are no palpable masses. The trachea is in the midline. Conjunctiva are clear, non-icteric.    Chest: The chest expands symmetrically. The lungs are clear to auscultation.    Heart: The rhythm is regular.  There are no murmurs, rubs, gallops or thrills.    Breasts:  Bilateral breasts are surgically removed.  No palpable chest wall masses, skin changes and or axillary adenopathy appreciated bilaterally.    Lymph Nodes:  The supraclavicular, axillary and cervical regions are free of significant lymphadenopathy.    Back: There is no vertebral column tenderness.    Skin: The skin appears normal. There are no suspicious appearing rashes or lesions.    Extremities: The extremities are without deformity, cyanosis or edema.    Impression:   Ms. Nora Claudio is a 64 year old woman presents with personal  history right breast DCIS with family history of breast cancer new diagnosis of left breast DCIS s/p bilateral mastectomy.    Recommendations:   I had a discussion with the Patient regarding her breast exam.  She is healing well since surgery with no signs of recurrent disease.   She should follow up in 12 months for her next surveillance exam.  I personally reviewed the pathology that confirmed successful removal of the remaining DCIS of the left breast and a focal ADH of the right.  No further treatment will be needed for this.  She will need no further mammograms status post bilateral mastectomies. I encouraged her to continue monitoring her ROM and strength and explained that a referral to physical therapy may be warranted in the future if she identifies any limitations or restrictions. She was given ample opportunity for questions and those questions were answered to her satisfaction. She was encouraged to contact the office with any questions or concerns prior to her next scheduled appointment.     This encounter lasted a total of 25 minutes, more than 50% of which was dedicated to the discussion of management options.

## 2025-02-18 RX ORDER — LOPERAMIDE HYDROCHLORIDE 2 MG/1
CAPSULE ORAL
Qty: 180 CAPSULE | Refills: 1 | Status: SHIPPED | OUTPATIENT
Start: 2025-02-18

## 2025-02-18 NOTE — TELEPHONE ENCOUNTER
Requested Prescriptions     Pending Prescriptions Disp Refills    LOPERAMIDE 2 MG Oral Cap [Pharmacy Med Name: LOPERAMIDE 2 MG CAPSULE] 180 capsule 2     Sig: TAKE 2 CAPSULES DAILY AS NEEDED FOR DIARRHEA.     Last seen: 5/29/24  Suggested follow up:  Last refill: 5/29/24    Refill pended, please review/sign if agreeable.

## 2025-04-01 ENCOUNTER — LAB ENCOUNTER (OUTPATIENT)
Dept: LAB | Facility: HOSPITAL | Age: 65
End: 2025-04-01
Attending: INTERNAL MEDICINE
Payer: MEDICARE

## 2025-04-01 DIAGNOSIS — E11.9 DIABETES MELLITUS (HCC): ICD-10-CM

## 2025-04-01 DIAGNOSIS — E78.00 PURE HYPERCHOLESTEROLEMIA: Primary | ICD-10-CM

## 2025-04-01 LAB
ALBUMIN SERPL-MCNC: 4.7 G/DL (ref 3.2–4.8)
ALBUMIN/GLOB SERPL: 2.4 {RATIO} (ref 1–2)
ALP LIVER SERPL-CCNC: 53 U/L
ALT SERPL-CCNC: 27 U/L
ANION GAP SERPL CALC-SCNC: 9 MMOL/L (ref 0–18)
AST SERPL-CCNC: 18 U/L (ref ?–34)
BASOPHILS # BLD AUTO: 0.02 X10(3) UL (ref 0–0.2)
BASOPHILS NFR BLD AUTO: 0.4 %
BILIRUB SERPL-MCNC: 0.5 MG/DL (ref 0.2–1.1)
BUN BLD-MCNC: 13 MG/DL (ref 9–23)
BUN/CREAT SERPL: 16.5 (ref 10–20)
CALCIUM BLD-MCNC: 9.7 MG/DL (ref 8.7–10.4)
CHLORIDE SERPL-SCNC: 105 MMOL/L (ref 98–112)
CHOLEST SERPL-MCNC: 144 MG/DL (ref ?–200)
CO2 SERPL-SCNC: 29 MMOL/L (ref 21–32)
CREAT BLD-MCNC: 0.79 MG/DL
DEPRECATED RDW RBC AUTO: 40.6 FL (ref 35.1–46.3)
EGFRCR SERPLBLD CKD-EPI 2021: 83 ML/MIN/1.73M2 (ref 60–?)
EOSINOPHIL # BLD AUTO: 0.1 X10(3) UL (ref 0–0.7)
EOSINOPHIL NFR BLD AUTO: 1.8 %
ERYTHROCYTE [DISTWIDTH] IN BLOOD BY AUTOMATED COUNT: 12.3 % (ref 11–15)
EST. AVERAGE GLUCOSE BLD GHB EST-MCNC: 123 MG/DL (ref 68–126)
FASTING PATIENT LIPID ANSWER: YES
FASTING STATUS PATIENT QL REPORTED: YES
GLOBULIN PLAS-MCNC: 2 G/DL (ref 2–3.5)
GLUCOSE BLD-MCNC: 103 MG/DL (ref 70–99)
HBA1C MFR BLD: 5.9 % (ref ?–5.7)
HCT VFR BLD AUTO: 38.4 %
HDLC SERPL-MCNC: 41 MG/DL (ref 40–59)
HGB BLD-MCNC: 13 G/DL
IMM GRANULOCYTES # BLD AUTO: 0.02 X10(3) UL (ref 0–1)
IMM GRANULOCYTES NFR BLD: 0.4 %
LDLC SERPL CALC-MCNC: 81 MG/DL (ref ?–100)
LYMPHOCYTES # BLD AUTO: 1.73 X10(3) UL (ref 1–4)
LYMPHOCYTES NFR BLD AUTO: 31.6 %
MCH RBC QN AUTO: 30.7 PG (ref 26–34)
MCHC RBC AUTO-ENTMCNC: 33.9 G/DL (ref 31–37)
MCV RBC AUTO: 90.8 FL
MONOCYTES # BLD AUTO: 0.59 X10(3) UL (ref 0.1–1)
MONOCYTES NFR BLD AUTO: 10.8 %
NEUTROPHILS # BLD AUTO: 3.01 X10 (3) UL (ref 1.5–7.7)
NEUTROPHILS # BLD AUTO: 3.01 X10(3) UL (ref 1.5–7.7)
NEUTROPHILS NFR BLD AUTO: 55 %
NONHDLC SERPL-MCNC: 103 MG/DL (ref ?–130)
OSMOLALITY SERPL CALC.SUM OF ELEC: 296 MOSM/KG (ref 275–295)
PLATELET # BLD AUTO: 267 10(3)UL (ref 150–450)
POTASSIUM SERPL-SCNC: 4.5 MMOL/L (ref 3.5–5.1)
PROT SERPL-MCNC: 6.7 G/DL (ref 5.7–8.2)
RBC # BLD AUTO: 4.23 X10(6)UL
SODIUM SERPL-SCNC: 143 MMOL/L (ref 136–145)
TRIGL SERPL-MCNC: 123 MG/DL (ref 30–149)
TSI SER-ACNC: 1.01 UIU/ML (ref 0.55–4.78)
VLDLC SERPL CALC-MCNC: 19 MG/DL (ref 0–30)
WBC # BLD AUTO: 5.5 X10(3) UL (ref 4–11)

## 2025-04-01 PROCEDURE — 84443 ASSAY THYROID STIM HORMONE: CPT

## 2025-04-01 PROCEDURE — 36415 COLL VENOUS BLD VENIPUNCTURE: CPT

## 2025-04-01 PROCEDURE — 83036 HEMOGLOBIN GLYCOSYLATED A1C: CPT

## 2025-04-01 PROCEDURE — 80053 COMPREHEN METABOLIC PANEL: CPT

## 2025-04-01 PROCEDURE — 80061 LIPID PANEL: CPT

## 2025-04-01 PROCEDURE — 85025 COMPLETE CBC W/AUTO DIFF WBC: CPT

## 2025-04-04 DIAGNOSIS — M81.0 POSTMENOPAUSAL OSTEOPOROSIS: Primary | ICD-10-CM

## 2025-04-30 ENCOUNTER — HOSPITAL ENCOUNTER (OUTPATIENT)
Dept: BONE DENSITY | Facility: HOSPITAL | Age: 65
Discharge: HOME | End: 2025-04-30
Attending: INTERNAL MEDICINE
Payer: MEDICARE

## 2025-04-30 DIAGNOSIS — M81.0 POSTMENOPAUSAL OSTEOPOROSIS: ICD-10-CM

## 2025-04-30 PROCEDURE — 77080 DXA BONE DENSITY AXIAL: CPT | Performed by: INTERNAL MEDICINE

## 2025-08-18 RX ORDER — LOPERAMIDE HYDROCHLORIDE 2 MG/1
CAPSULE ORAL
Qty: 180 CAPSULE | Refills: 0 | Status: SHIPPED | OUTPATIENT
Start: 2025-08-18

## (undated) DIAGNOSIS — R92.8 ABNORMALITY OF LEFT BREAST ON SCREENING MAMMOGRAM: Primary | ICD-10-CM

## (undated) DEVICE — HEMOCLIP HORIZON SM 001200

## (undated) DEVICE — SUT VICRYL 3-0 SH J416H

## (undated) DEVICE — SUT MONOCRYL 4-0 PS-2 Y496G

## (undated) DEVICE — DRAPE PACK CHEST & U BAR

## (undated) DEVICE — 3M(TM) TEGADERM(TM) TRANSPARENT FILM DRESSING FRAME STYLE 9505W: Brand: 3M™ TEGADERM™

## (undated) DEVICE — SUPER SPONGES,MEDIUM: Brand: KERLIX

## (undated) DEVICE — DRAPE,TAPE STRIPS,STERILE: Brand: MEDLINE

## (undated) DEVICE — UNDERPAD 23X36 LIGHT CHUX

## (undated) DEVICE — TIP BOVIE 4" MEGADYNE

## (undated) DEVICE — DRAIN SILICONE FLAT 10X20

## (undated) DEVICE — HEMOCLIP HORIZON MED 002200

## (undated) DEVICE — BREAST-HERNIA-PORT CDS-LF: Brand: MEDLINE INDUSTRIES, INC.

## (undated) DEVICE — TOWEL SURG OR 17X30IN BLUE

## (undated) DEVICE — SPECIMEN CONTAINER,POSITIVE SEAL INDICATOR, OR PACKAGED: Brand: PRECISION

## (undated) DEVICE — CASED DISP BIPOLAR CORD

## (undated) DEVICE — DRESSING BIOPATCH 1X4 BLUE

## (undated) DEVICE — LIGHT HANDLE

## (undated) DEVICE — MEGADYNE E-Z CLEAN BLADE 2.75"

## (undated) DEVICE — STANDARD HYPODERMIC NEEDLE,POLYPROPYLENE HUB: Brand: MONOJECT

## (undated) DEVICE — SOLUTION  .9 1000ML BTL

## (undated) DEVICE — ABDOMINAL PAD: Brand: DERMACEA

## (undated) DEVICE — STERILE POLYISOPRENE POWDER-FREE SURGICAL GLOVES: Brand: PROTEXIS

## (undated) DEVICE — SYRINGE 5ML LL TIP

## (undated) DEVICE — SLEEVE KENDALL SCD EXPRESS MED

## (undated) DEVICE — BRA SURGICAL ELIZABETH PINK XL

## (undated) DEVICE — PROVE COVER: Brand: UNBRANDED

## (undated) DEVICE — VIOLET BRAIDED (POLYGLACTIN 910), SYNTHETIC ABSORBABLE SUTURE: Brand: COATED VICRYL

## (undated) DEVICE — 3M™ STERI-DRAPE™ INSTRUMENT POUCH 1018: Brand: STERI-DRAPE™

## (undated) DEVICE — SUT SILK 2-0 FS 685G

## (undated) DEVICE — EVACUATOR URO RELIVAC 100CC

## (undated) DEVICE — #15 STERILE STAINLESS BLADE: Brand: STERILE STAINLESS BLADES

## (undated) NOTE — Clinical Note
Dear Clif,  I had the opportunity to see your patient Nora Claudio recently. I appreciate your confidence in me to care for your patients. Please feel free call me with any questions at [phone] or contact me through Epic.  Sincerely, Erickson Dsouza MD Board Certified, Physical Medicine and Rehabilitation Specializing in Sports Medicine, Spine Medicine and Electrodiagnostic Medicine [facility]

## (undated) NOTE — Clinical Note
Dear Clif,  I had the opportunity to see your patient Nroa Claudio recently. I appreciate your confidence in me to care for your patients. Please feel free call me with any questions at [phone] or contact me through Epic.  Sincerely, Erickson Dsouza MD Board Certified, Physical Medicine and Rehabilitation Specializing in Sports Medicine, Spine Medicine and Electrodiagnostic Medicine [facility]

## (undated) NOTE — LETTER
WHERE IS YOUR PAIN NOW?  Debbie the areas on your body where you feel the described sensations.  Use the appropriate symbol.  Debbie the areas of radiation.  Include all affected areas.  Just to complete the picture, please draw in the face.     ACHE:  ^ ^ ^   NUMBNESS:  0000   PINS & NEEDLES:  = = = =                              ^ ^ ^                       0000              = = = =                                    ^ ^ ^                       0000            = = = =      BURNING:  XXXX   STABBING: ////                  XXXX                ////                         XXXX          ////     Please debbie the line below indicating your degree of pain right now  with 0 being no pain 10 being the worst pain possible.                                         0             1             2              3             4              5              6              7             8             9             10         Patient Signature:

## (undated) NOTE — ED AVS SNAPSHOT
Claretta Aschoff   MRN: [id number]    Department:  [facility] Emergency Department   Date of Visit:  8/18/2019           Disclosure     Insurance plans vary and the physician(s) referred by the ER may not be covered by your plan.  Please contact CARE PHYSICIAN AT ONCE OR RETURN IMMEDIATELY TO THE EMERGENCY DEPARTMENT. If you have been prescribed any medication(s), please fill your prescription right away and begin taking the medication(s) as directed.   If you believe that any of the medications

## (undated) NOTE — LETTER
To: Dr. Valles Epp   Date:  6/23/2022  Fax #: [phone]  Patient Name: Aide Arredondo / Sex: 5/27/1960-A: 60 y  female  Phone:  [phone]  CSN: [id number]        Medical Records: [id number]    Clearance for Surgery Requested by Surgeon    Request for:  Medical Clearance    Requested by Surgeon: Mitchell Murray MD    Surgical Date: 6/28/2022    Procedure: Bilateral simple mastectomies, left lymphoscintigraphy, left sentinel lymph node biopsy, possible left axillary lymph node dissection. , Bilateral      Please fax the clearance note to the [place] [phone]. Thank you.